# Patient Record
Sex: FEMALE | Race: OTHER | HISPANIC OR LATINO | URBAN - METROPOLITAN AREA
[De-identification: names, ages, dates, MRNs, and addresses within clinical notes are randomized per-mention and may not be internally consistent; named-entity substitution may affect disease eponyms.]

---

## 2023-04-14 ENCOUNTER — INPATIENT (INPATIENT)
Facility: HOSPITAL | Age: 68
LOS: 3 days | Discharge: HOME | DRG: 291 | End: 2023-04-18
Attending: HOSPITALIST | Admitting: HOSPITALIST
Payer: COMMERCIAL

## 2023-04-14 VITALS
OXYGEN SATURATION: 65 % | WEIGHT: 195.11 LBS | HEART RATE: 128 BPM | TEMPERATURE: 98 F | HEIGHT: 62 IN | RESPIRATION RATE: 40 BRPM | SYSTOLIC BLOOD PRESSURE: 85 MMHG | DIASTOLIC BLOOD PRESSURE: 70 MMHG

## 2023-04-14 DIAGNOSIS — J96.01 ACUTE RESPIRATORY FAILURE WITH HYPOXIA: ICD-10-CM

## 2023-04-14 DIAGNOSIS — I95.9 HYPOTENSION, UNSPECIFIED: ICD-10-CM

## 2023-04-14 DIAGNOSIS — Z90.49 ACQUIRED ABSENCE OF OTHER SPECIFIED PARTS OF DIGESTIVE TRACT: Chronic | ICD-10-CM

## 2023-04-14 DIAGNOSIS — M10.9 GOUT, UNSPECIFIED: ICD-10-CM

## 2023-04-14 DIAGNOSIS — I10 ESSENTIAL (PRIMARY) HYPERTENSION: ICD-10-CM

## 2023-04-14 DIAGNOSIS — Z29.9 ENCOUNTER FOR PROPHYLACTIC MEASURES, UNSPECIFIED: ICD-10-CM

## 2023-04-14 DIAGNOSIS — I50.1 LEFT VENTRICULAR FAILURE, UNSPECIFIED: ICD-10-CM

## 2023-04-14 DIAGNOSIS — R65.10 SYSTEMIC INFLAMMATORY RESPONSE SYNDROME (SIRS) OF NON-INFECTIOUS ORIGIN WITHOUT ACUTE ORGAN DYSFUNCTION: ICD-10-CM

## 2023-04-14 DIAGNOSIS — E11.9 TYPE 2 DIABETES MELLITUS WITHOUT COMPLICATIONS: ICD-10-CM

## 2023-04-14 DIAGNOSIS — I50.31 ACUTE DIASTOLIC (CONGESTIVE) HEART FAILURE: ICD-10-CM

## 2023-04-14 LAB
ALBUMIN SERPL ELPH-MCNC: 3.2 G/DL — LOW (ref 3.5–5)
ALP SERPL-CCNC: 111 U/L — SIGNIFICANT CHANGE UP (ref 40–120)
ALT FLD-CCNC: 27 U/L DA — SIGNIFICANT CHANGE UP (ref 10–60)
ANION GAP SERPL CALC-SCNC: 7 MMOL/L — SIGNIFICANT CHANGE UP (ref 5–17)
APPEARANCE UR: CLEAR — SIGNIFICANT CHANGE UP
APTT BLD: 28.5 SEC — SIGNIFICANT CHANGE UP (ref 27.5–35.5)
AST SERPL-CCNC: 24 U/L — SIGNIFICANT CHANGE UP (ref 10–40)
BACTERIA # UR AUTO: ABNORMAL /HPF
BASE EXCESS BLDV CALC-SCNC: -3.2 MMOL/L — SIGNIFICANT CHANGE UP
BASOPHILS # BLD AUTO: 0.1 K/UL — SIGNIFICANT CHANGE UP (ref 0–0.2)
BASOPHILS NFR BLD AUTO: 0.9 % — SIGNIFICANT CHANGE UP (ref 0–2)
BILIRUB SERPL-MCNC: 0.4 MG/DL — SIGNIFICANT CHANGE UP (ref 0.2–1.2)
BILIRUB UR-MCNC: NEGATIVE — SIGNIFICANT CHANGE UP
BLD GP AB SCN SERPL QL: SIGNIFICANT CHANGE UP
BUN SERPL-MCNC: 16 MG/DL — SIGNIFICANT CHANGE UP (ref 7–18)
CALCIUM SERPL-MCNC: 9.2 MG/DL — SIGNIFICANT CHANGE UP (ref 8.4–10.5)
CHLORIDE SERPL-SCNC: 104 MMOL/L — SIGNIFICANT CHANGE UP (ref 96–108)
CO2 SERPL-SCNC: 25 MMOL/L — SIGNIFICANT CHANGE UP (ref 22–31)
COLOR SPEC: YELLOW — SIGNIFICANT CHANGE UP
CREAT SERPL-MCNC: 0.97 MG/DL — SIGNIFICANT CHANGE UP (ref 0.5–1.3)
DIFF PNL FLD: NEGATIVE — SIGNIFICANT CHANGE UP
EGFR: 64 ML/MIN/1.73M2 — SIGNIFICANT CHANGE UP
EOSINOPHIL # BLD AUTO: 0.53 K/UL — HIGH (ref 0–0.5)
EOSINOPHIL NFR BLD AUTO: 4.7 % — SIGNIFICANT CHANGE UP (ref 0–6)
EPI CELLS # UR: SIGNIFICANT CHANGE UP /HPF
GLUCOSE BLDC GLUCOMTR-MCNC: 156 MG/DL — HIGH (ref 70–99)
GLUCOSE BLDC GLUCOMTR-MCNC: 177 MG/DL — HIGH (ref 70–99)
GLUCOSE BLDC GLUCOMTR-MCNC: 182 MG/DL — HIGH (ref 70–99)
GLUCOSE SERPL-MCNC: 300 MG/DL — HIGH (ref 70–99)
GLUCOSE UR QL: 100 MG/DL
HCO3 BLDV-SCNC: 26 MMOL/L — SIGNIFICANT CHANGE UP (ref 22–29)
HCT VFR BLD CALC: 34.6 % — SIGNIFICANT CHANGE UP (ref 34.5–45)
HGB BLD-MCNC: 10.6 G/DL — LOW (ref 11.5–15.5)
HYALINE CASTS # UR AUTO: ABNORMAL /LPF
IMM GRANULOCYTES NFR BLD AUTO: 0.6 % — SIGNIFICANT CHANGE UP (ref 0–0.9)
INR BLD: 1.04 RATIO — SIGNIFICANT CHANGE UP (ref 0.88–1.16)
KETONES UR-MCNC: NEGATIVE — SIGNIFICANT CHANGE UP
LACTATE SERPL-SCNC: 2.8 MMOL/L — HIGH (ref 0.7–2)
LACTATE SERPL-SCNC: 3.5 MMOL/L — HIGH (ref 0.7–2)
LACTATE SERPL-SCNC: 4 MMOL/L — CRITICAL HIGH (ref 0.7–2)
LEUKOCYTE ESTERASE UR-ACNC: ABNORMAL
LYMPHOCYTES # BLD AUTO: 3.91 K/UL — HIGH (ref 1–3.3)
LYMPHOCYTES # BLD AUTO: 34.5 % — SIGNIFICANT CHANGE UP (ref 13–44)
MCHC RBC-ENTMCNC: 23.6 PG — LOW (ref 27–34)
MCHC RBC-ENTMCNC: 30.6 GM/DL — LOW (ref 32–36)
MCV RBC AUTO: 76.9 FL — LOW (ref 80–100)
MONOCYTES # BLD AUTO: 0.5 K/UL — SIGNIFICANT CHANGE UP (ref 0–0.9)
MONOCYTES NFR BLD AUTO: 4.4 % — SIGNIFICANT CHANGE UP (ref 2–14)
NEUTROPHILS # BLD AUTO: 6.21 K/UL — SIGNIFICANT CHANGE UP (ref 1.8–7.4)
NEUTROPHILS NFR BLD AUTO: 54.9 % — SIGNIFICANT CHANGE UP (ref 43–77)
NITRITE UR-MCNC: POSITIVE
NRBC # BLD: 0 /100 WBCS — SIGNIFICANT CHANGE UP (ref 0–0)
NT-PROBNP SERPL-SCNC: 227 PG/ML — HIGH (ref 0–125)
PCO2 BLDV: 61 MMHG — HIGH (ref 39–42)
PH BLDV: 7.23 — LOW (ref 7.32–7.43)
PH UR: 5 — SIGNIFICANT CHANGE UP (ref 5–8)
PLATELET # BLD AUTO: 336 K/UL — SIGNIFICANT CHANGE UP (ref 150–400)
PO2 BLDV: 62 MMHG — SIGNIFICANT CHANGE UP
POTASSIUM SERPL-MCNC: 3.9 MMOL/L — SIGNIFICANT CHANGE UP (ref 3.5–5.3)
POTASSIUM SERPL-SCNC: 3.9 MMOL/L — SIGNIFICANT CHANGE UP (ref 3.5–5.3)
PROT SERPL-MCNC: 8.5 G/DL — HIGH (ref 6–8.3)
PROT UR-MCNC: 30 MG/DL
PROTHROM AB SERPL-ACNC: 12.4 SEC — SIGNIFICANT CHANGE UP (ref 10.5–13.4)
RBC # BLD: 4.5 M/UL — SIGNIFICANT CHANGE UP (ref 3.8–5.2)
RBC # FLD: 16.9 % — HIGH (ref 10.3–14.5)
RBC CASTS # UR COMP ASSIST: SIGNIFICANT CHANGE UP /HPF (ref 0–2)
SAO2 % BLDV: 84.1 % — SIGNIFICANT CHANGE UP
SARS-COV-2 RNA SPEC QL NAA+PROBE: SIGNIFICANT CHANGE UP
SODIUM SERPL-SCNC: 136 MMOL/L — SIGNIFICANT CHANGE UP (ref 135–145)
SP GR SPEC: 1.02 — SIGNIFICANT CHANGE UP (ref 1.01–1.02)
TROPONIN I, HIGH SENSITIVITY RESULT: 14.1 NG/L — SIGNIFICANT CHANGE UP
UROBILINOGEN FLD QL: NEGATIVE — SIGNIFICANT CHANGE UP
WBC # BLD: 11.32 K/UL — HIGH (ref 3.8–10.5)
WBC # FLD AUTO: 11.32 K/UL — HIGH (ref 3.8–10.5)
WBC UR QL: SIGNIFICANT CHANGE UP /HPF (ref 0–5)

## 2023-04-14 PROCEDURE — 99291 CRITICAL CARE FIRST HOUR: CPT

## 2023-04-14 PROCEDURE — 71045 X-RAY EXAM CHEST 1 VIEW: CPT | Mod: 26

## 2023-04-14 PROCEDURE — 71275 CT ANGIOGRAPHY CHEST: CPT | Mod: 26,MA

## 2023-04-14 PROCEDURE — 99223 1ST HOSP IP/OBS HIGH 75: CPT | Mod: GC

## 2023-04-14 RX ORDER — ACETAMINOPHEN 500 MG
650 TABLET ORAL EVERY 6 HOURS
Refills: 0 | Status: DISCONTINUED | OUTPATIENT
Start: 2023-04-14 | End: 2023-04-18

## 2023-04-14 RX ORDER — ONDANSETRON 8 MG/1
4 TABLET, FILM COATED ORAL EVERY 8 HOURS
Refills: 0 | Status: DISCONTINUED | OUTPATIENT
Start: 2023-04-14 | End: 2023-04-18

## 2023-04-14 RX ORDER — CARVEDILOL PHOSPHATE 80 MG/1
3.12 CAPSULE, EXTENDED RELEASE ORAL EVERY 12 HOURS
Refills: 0 | Status: DISCONTINUED | OUTPATIENT
Start: 2023-04-14 | End: 2023-04-15

## 2023-04-14 RX ORDER — LISINOPRIL 2.5 MG/1
2.5 TABLET ORAL DAILY
Refills: 0 | Status: DISCONTINUED | OUTPATIENT
Start: 2023-04-14 | End: 2023-04-18

## 2023-04-14 RX ORDER — ASPIRIN/CALCIUM CARB/MAGNESIUM 324 MG
81 TABLET ORAL DAILY
Refills: 0 | Status: DISCONTINUED | OUTPATIENT
Start: 2023-04-14 | End: 2023-04-18

## 2023-04-14 RX ORDER — ENOXAPARIN SODIUM 100 MG/ML
40 INJECTION SUBCUTANEOUS EVERY 24 HOURS
Refills: 0 | Status: DISCONTINUED | OUTPATIENT
Start: 2023-04-14 | End: 2023-04-18

## 2023-04-14 RX ORDER — INSULIN LISPRO 100/ML
6 VIAL (ML) SUBCUTANEOUS
Refills: 0 | Status: DISCONTINUED | OUTPATIENT
Start: 2023-04-14 | End: 2023-04-18

## 2023-04-14 RX ORDER — ALLOPURINOL 300 MG
100 TABLET ORAL DAILY
Refills: 0 | Status: DISCONTINUED | OUTPATIENT
Start: 2023-04-14 | End: 2023-04-18

## 2023-04-14 RX ORDER — LANOLIN ALCOHOL/MO/W.PET/CERES
3 CREAM (GRAM) TOPICAL AT BEDTIME
Refills: 0 | Status: DISCONTINUED | OUTPATIENT
Start: 2023-04-14 | End: 2023-04-18

## 2023-04-14 RX ORDER — INSULIN GLARGINE 100 [IU]/ML
10 INJECTION, SOLUTION SUBCUTANEOUS AT BEDTIME
Refills: 0 | Status: DISCONTINUED | OUTPATIENT
Start: 2023-04-14 | End: 2023-04-18

## 2023-04-14 RX ORDER — FUROSEMIDE 40 MG
20 TABLET ORAL
Refills: 0 | Status: DISCONTINUED | OUTPATIENT
Start: 2023-04-14 | End: 2023-04-16

## 2023-04-14 RX ORDER — INSULIN LISPRO 100/ML
VIAL (ML) SUBCUTANEOUS
Refills: 0 | Status: DISCONTINUED | OUTPATIENT
Start: 2023-04-14 | End: 2023-04-18

## 2023-04-14 RX ADMIN — Medication 1: at 16:06

## 2023-04-14 RX ADMIN — Medication 81 MILLIGRAM(S): at 16:06

## 2023-04-14 RX ADMIN — INSULIN GLARGINE 10 UNIT(S): 100 INJECTION, SOLUTION SUBCUTANEOUS at 22:09

## 2023-04-14 RX ADMIN — ENOXAPARIN SODIUM 40 MILLIGRAM(S): 100 INJECTION SUBCUTANEOUS at 16:06

## 2023-04-14 RX ADMIN — CARVEDILOL PHOSPHATE 3.12 MILLIGRAM(S): 80 CAPSULE, EXTENDED RELEASE ORAL at 20:24

## 2023-04-14 RX ADMIN — Medication 20 MILLIGRAM(S): at 16:05

## 2023-04-14 RX ADMIN — Medication 1: at 21:54

## 2023-04-14 NOTE — CONSULT NOTE ADULT - ASSESSMENT
67 female, from home, with PMHx of DM, HTN, Gout, former smoker, who comes in from a rapid response in the lobby for shortness of breath,acute disatolic HF.  1.Tele monitoring.  2.Echocardiogram.  3.Acute distolic HF-IV lasix 20mg bid.  4.HTN-low dose ace and coreg.  5.DM-Insulin.  6.Cont asa,statin.  7.GI and DVT prophylaxis.

## 2023-04-14 NOTE — ED ADULT TRIAGE NOTE - CHIEF COMPLAINT QUOTE
Rapid response from lobby with C/o SOB since this morning. Unable to tolerate PO temp. Reports scheduled for NHUNG today with cardiology

## 2023-04-14 NOTE — ED PROVIDER NOTE - CLINICAL SUMMARY MEDICAL DECISION MAKING FREE TEXT BOX
very concerning vitals on arrival, profound hypoxic on RA (65%) and severe hypotension (60s systolic),  labs for sepsis, AHF, ARF requested and placed on NRB; BP and Spo2 corrected within minutes  rectal temp afebrile, considering more cardiac renal causes  reviewed old records, last record here 2016 non contributory at this time

## 2023-04-14 NOTE — H&P ADULT - HISTORY OF PRESENT ILLNESS
Patient is a 67 female, from home, with PMHx of DM, HTN, Gout, former smoker, who comes in from a rapid response in the lobby for shortness of breath. She has been feeling normal yesterday. Since this morning, she felt short of breath. It progressively got worse over time and when she was in the lobby, she told the staff that she does not feel good. She denied any chest pain. Patient denies headache, fever, chills, nausea, vomit, chest pain, cough, abdominal pain, diarrhea, constipation, dark/bloody stool, dysuria, hematuria, loss of strength, or loss of sensation.  The patient was scheduled to get a NHUNG today.

## 2023-04-14 NOTE — H&P ADULT - ATTENDING COMMENTS
67F with PMHx of HTN, T2DM and known aortic/mitral stenosis was initially here for NHUNG, but had RRT called in the lobby for hypotension, hypoxia, and unwellness. Patient recently admitted to [facility] for Pneumonia & pulmonary edema (Feb. 2023). She was following up with her outpatient cardiologist who had performed a TTE on Dec. 2022. Patient pending NHUNG for better visualization. She does have ESPINAL. Chronically poor exercise tolerance. While in the ED her VS stabilized without intervention.    VSS now, saturating well on 2 L NC  +crackles  +systolic murmur 4/6 in aortic region    Labs personally reviewed  WBC: 11  Lactate 4 (done during rapid)    CTA Chest personally reviewed by me: no PE, +pulmonary edema    A/P:  Likely AHRF 2/2 to decompensated heart disease from likely known severe valvular pathology. Cardiology consulted & recommending TTE for now, will consider NHUNG. Low dose BP medications. Patient with possible cirrhotic changes on CT, but she knows about this. Pending further cardiac work up and management of volume status.

## 2023-04-14 NOTE — ED PROVIDER NOTE - OBJECTIVE STATEMENT
seen at start of my shift, 0730, came via walk in for shortness of breath, had an appt at the hospital today for a NHUNG  recently admitted to [facility]  co shortness of breath onset this am, no cp n v d, no sick contacts, non smoker, no drugs

## 2023-04-14 NOTE — H&P ADULT - PROBLEM SELECTOR PLAN 1
- P/w shortness of breath  - 65% on room air -> NRB -> 2L NC saturating 100%. Patient is comfortable, talking  - Likely due to flash cardiogenic pulmonary edema from valvular dysfunction  - CTA chest = No PE. Likely pulmonary edema with small bilateral pleural effusions.  - Trop negative  - Pro-BNP = 227  - EKG = Sinus tachycardia. Septal infarct, age undetermined  - Admit to telemetry  - Cardio Dr. Hall consulted, will wait on recs - P/w shortness of breath  - 65% on room air -> NRB -> 2L NC saturating 100%. Patient is comfortable, talking  - Likely due to flash cardiogenic pulmonary edema from valvular dysfunction  - CTA chest = No PE. Likely pulmonary edema with small bilateral pleural effusions.  - Trop negative  - Pro-BNP = 227  - EKG = Sinus tachycardia. Septal infarct, age undetermined  - Admit to telemetry  - Cardio Dr. Hall consulted = Echo

## 2023-04-14 NOTE — H&P ADULT - PROBLEM SELECTOR PLAN 5
- Home med Humalog 18U BID, Metformin 500 BID, Ozempic 0.25 weekly  - Adding Admelog 5U TID premeals + 10U Lantus  - ISS + FS achs  - Titrate up as needed - Home med Humalog 18U BID, Metformin 500 BID, Ozempic 0.25 weekly  - Adding Admelog 6U TID premeals + 10U Lantus  - ISS + FS achs  - Titrate up as needed

## 2023-04-14 NOTE — H&P ADULT - PROBLEM SELECTOR PLAN 4
- Home meds Amlodipine 5mg, Metoprolol Succinate 25mg ER, Enalapril 20mg daily  - Will hold home meds - Home meds Amlodipine 5mg, Metoprolol Succinate 25mg ER, Enalapril 20mg daily  - Will hold home meds for now due to recent hypotension  - Consider adding Metoprolol first - Home meds Amlodipine 5mg, Metoprolol Succinate 25mg ER, Enalapril 20mg daily  - Cardio Dr. Hall = Coreg 3.125 BID, Lisinopril 2.5 BID

## 2023-04-14 NOTE — ED ADULT NURSE NOTE - OBJECTIVE STATEMENT
Pt arrived to ED via rapid response in the lobby, was scheduled for NHUNG today but c/o difficulty breathing and cough  Pt is awake alert and oriented x 3 , afebrile , diaphoretic and has difficulty breathing

## 2023-04-14 NOTE — ED PROVIDER NOTE - CARE PLAN
Principal Discharge DX:	Acute diastolic congestive heart failure  Secondary Diagnosis:	Hypoxemia  Secondary Diagnosis:	Hypotension   1

## 2023-04-14 NOTE — ED PROVIDER NOTE - SEVERE SEPSIS ALERT DETAILS
seen at start of my shift, 0730, came via walk in for shortness of breath, had an appt at the hospital today for a NHUNG  recently admitted to [facility]  co shortness of breath onset this am, no cp n v d, no sick contacts, non smoker, no drugs

## 2023-04-14 NOTE — CONSULT NOTE ADULT - SUBJECTIVE AND OBJECTIVE BOX
CHIEF COMPLAINT:Patient is a 67y old  Female who presents with a chief complaint of AHRF secondary to acute cardiogenic pulmonary edema, with valvular involvement. (14 Apr 2023 12:19)      HPI:  Patient is a 67 female, from home, with PMHx of DM, HTN, Gout, former smoker, who comes in from a rapid response in the lobby for shortness of breath. She has been feeling normal yesterday. Since this morning, she felt short of breath. It progressively got worse over time and when she was in the lobby, she told the staff that she does not feel good. She denied any chest pain. Patient denies headache, fever, chills, nausea, vomit, chest pain, cough, abdominal pain, diarrhea, constipation, dark/bloody stool, dysuria, hematuria, loss of strength, or loss of sensation.  The patient was scheduled to get a NHUNG today to eval AS and mS severity. Pt was at [facility] 2/23 for pulmonary edema.      PAST MEDICAL & SURGICAL HISTORY:  DM (diabetes mellitus)      HTN (hypertension)      S/P cholecystectomy          MEDICATIONS  (STANDING):  enoxaparin Injectable 40 milliGRAM(s) SubCutaneous every 24 hours  insulin glargine Injectable (LANTUS) 10 Unit(s) SubCutaneous at bedtime  insulin lispro (ADMELOG) corrective regimen sliding scale   SubCutaneous Before meals and at bedtime  insulin lispro Injectable (ADMELOG) 6 Unit(s) SubCutaneous three times a day before meals    MEDICATIONS  (PRN):  acetaminophen     Tablet .. 650 milliGRAM(s) Oral every 6 hours PRN Temp greater or equal to 38C (100.4F), Mild Pain (1 - 3)  aluminum hydroxide/magnesium hydroxide/simethicone Suspension 30 milliLiter(s) Oral every 4 hours PRN Dyspepsia  melatonin 3 milliGRAM(s) Oral at bedtime PRN Insomnia  ondansetron Injectable 4 milliGRAM(s) IV Push every 8 hours PRN Nausea and/or Vomiting      FAMILY HISTORY:  FH: COPD (chronic obstructive pulmonary disease) (Father)        SOCIAL HISTORY:    [x ] Non-smoker    [x ] Alcohol-denies    Allergies    DE (Anaphylaxis)  No Known Drug Allergies    Intolerances    	    REVIEW OF SYSTEMS:  CONSTITUTIONAL: No fever, weight loss, or fatigue  EYES: No eye pain, visual disturbances, or discharge  ENT:  No difficulty hearing, tinnitus, vertigo; No sinus or throat pain  NECK: No pain or stiffness  RESPIRATORY: No cough, wheezing, chills or hemoptysis; + Shortness of Breath  CARDIOVASCULAR: No chest pain, palpitations, passing out, dizziness, or leg swelling  GASTROINTESTINAL: No abdominal or epigastric pain. No nausea, vomiting, or hematemesis; No diarrhea or constipation. No melena or hematochezia.  GENITOURINARY: No dysuria, frequency, hematuria, or incontinence  NEUROLOGICAL: No headaches, memory loss, loss of strength, numbness, or tremors  SKIN: No itching, burning, rashes, or lesions   LYMPH Nodes: No enlarged glands  ENDOCRINE: No heat or cold intolerance; No hair loss  MUSCULOSKELETAL: No joint pain or swelling; No muscle, back, or extremity pain  PSYCHIATRIC: No depression, anxiety, mood swings, or difficulty sleeping  HEME/LYMPH: No easy bruising, or bleeding gums  ALLERGY AND IMMUNOLOGIC: No hives or eczema	        PHYSICAL EXAM:  T(C): 36.9 (04-14-23 @ 10:54), Max: 36.9 (04-14-23 @ 10:54)  HR: 78 (04-14-23 @ 10:54) (78 - 128)  BP: 121/64 (04-14-23 @ 10:54) (85/70 - 150/67)  RR: 16 (04-14-23 @ 10:54) (16 - 40)  SpO2: 98% (04-14-23 @ 10:54) (65% - 100%)  Wt(kg): --  I&O's Summary      Appearance: Normal	  HEENT:   Normal oral mucosa, PERRL, EOMI	  Lymphatic: No lymphadenopathy  Cardiovascular: Normal S1 S2, No JVD, No murmurs, No edema  Respiratory: Lungs clear to auscultation	  Psychiatry: A & O x 3, Mood & affect appropriate  Gastrointestinal:  Soft, Non-tender, + BS	  Skin: No rashes, No ecchymoses, No cyanosis	  Neurologic: Non-focal  Extremities: Normal range of motion, No clubbing, cyanosis or edema  Vascular: Peripheral pulses palpable 2+ bilaterally       ECG:  	sinus tach,q v1 and v2  	  	  LABS:	 	    Troponin I, High Sensitivity Result: 14.1 ng/L (04-14-23 @ 07:40)                          10.6   11.32 )-----------( 336      ( 14 Apr 2023 07:40 )             34.6     04-14    136  |  104  |  16  ----------------------------<  300<H>  3.9   |  25  |  0.97    Ca    9.2      14 Apr 2023 07:40    TPro  8.5<H>  /  Alb  3.2<L>  /  TBili  0.4  /  DBili  x   /  AST  24  /  ALT  27  /  AlkPhos  111  04-14    < from: CT Angio Chest PE Protocol w/ IV Cont (04.14.23 @ 10:42) >  ACC: [id number] EXAM:  CT ANGIO CHEST PULM ART [facility]   ORDERED BY: CLAUDIA CULP     PROCEDURE DATE:  04/14/2023          INTERPRETATION:  HISTORY: Shortness of breath.    EXAMINATION: CTA CHEST was performed for evaluation of the pulmonary   arteries. Multiplanar reformatted images and MIPS were acquired.  CONTRAST/COMPLICATIONS:  IV Contrast: Omnipaque 350  70 cc administered   30 cc discarded  Oral Contrast: NONE  Complications: None reported at time of study completion    COMPARISON: 11/1/2016.    FINDINGS:    PULMONARY ARTERIES: No pulmonary embolism.    MEDIASTINUM: Mitral annular calcifications. Dilated left atrium. Coronary   atherosclerosis. No pericardial effusion. Ascending thoracic aorta   measures 3.9 cm. Mediastinal bilateral hilar lymphadenopathy appears   similar compared to 1116; reference subcarinal 2.6 x 1.7 cm node (image   227, series 4).    AIRWAYS, LUNGS, PLEURA: Clear central airways. Emphysema. Hazy   ground-glass opacities throughout the lungs bilaterally.Interlobular   septal thickening. Small bilateral pleural effusions (right greater than   left) with associated right basilar atelectasis.    IMAGED ABDOMEN: Surface contour nodularity of the liver. Cholecystectomy.   Left adrenal myelolipoma. Few prominent upper abdominal nodes as on   11/1/2016.    SOFT TISSUES: Anterior chest wall collateral vessels.    BONES: Unremarkable.      IMPRESSION:.    No pulmonary embolism.    Likely pulmonary edema with small bilateral pleural effusions.    Surface contour nodularity of the liver is suggested; possibility of   chronic liver disease is raised and recommend correlation with LFTs.    --- End of Report ---             MACHO PETER MD; Attending Radiologist  This document has been electronically signed. Apr 14 2023 11:00AM    < end of copied text >       CHIEF COMPLAINT:Patient is a 67y old  Female who presents with a chief complaint of AHRF secondary to acute cardiogenic pulmonary edema, with valvular involvement. (14 Apr 2023 12:19)      HPI:  Patient is a 67 female, from home, with PMHx of DM, HTN, Gout, former smoker, who comes in from a rapid response in the lobby for shortness of breath. She has been feeling normal yesterday. Since this morning, she felt short of breath. It progressively got worse over time and when she was in the lobby, she told the staff that she does not feel good. She denied any chest pain. Patient denies headache, fever, chills, nausea, vomit, chest pain, cough, abdominal pain, diarrhea, constipation, dark/bloody stool, dysuria, hematuria, loss of strength, or loss of sensation.  The patient was scheduled to get a NHUNG today to eval AS and mS severity. Pt was at [facility] 2/23 for pulmonary edema.      PAST MEDICAL & SURGICAL HISTORY:  DM (diabetes mellitus)      HTN (hypertension)      S/P cholecystectomy          MEDICATIONS  (STANDING):  enoxaparin Injectable 40 milliGRAM(s) SubCutaneous every 24 hours  insulin glargine Injectable (LANTUS) 10 Unit(s) SubCutaneous at bedtime  insulin lispro (ADMELOG) corrective regimen sliding scale   SubCutaneous Before meals and at bedtime  insulin lispro Injectable (ADMELOG) 6 Unit(s) SubCutaneous three times a day before meals    MEDICATIONS  (PRN):  acetaminophen     Tablet .. 650 milliGRAM(s) Oral every 6 hours PRN Temp greater or equal to 38C (100.4F), Mild Pain (1 - 3)  aluminum hydroxide/magnesium hydroxide/simethicone Suspension 30 milliLiter(s) Oral every 4 hours PRN Dyspepsia  melatonin 3 milliGRAM(s) Oral at bedtime PRN Insomnia  ondansetron Injectable 4 milliGRAM(s) IV Push every 8 hours PRN Nausea and/or Vomiting      FAMILY HISTORY:  FH: COPD (chronic obstructive pulmonary disease) (Father)        SOCIAL HISTORY:    [x ] Non-smoker    [x ] Alcohol-denies    Allergies    DE (Anaphylaxis)  No Known Drug Allergies    Intolerances    	    REVIEW OF SYSTEMS:  CONSTITUTIONAL: No fever, weight loss, or fatigue  EYES: No eye pain, visual disturbances, or discharge  ENT:  No difficulty hearing, tinnitus, vertigo; No sinus or throat pain  NECK: No pain or stiffness  RESPIRATORY: No cough, wheezing, chills or hemoptysis; + Shortness of Breath  CARDIOVASCULAR: No chest pain, palpitations, passing out, dizziness, or leg swelling  GASTROINTESTINAL: No abdominal or epigastric pain. No nausea, vomiting, or hematemesis; No diarrhea or constipation. No melena or hematochezia.  GENITOURINARY: No dysuria, frequency, hematuria, or incontinence  NEUROLOGICAL: No headaches, memory loss, loss of strength, numbness, or tremors  SKIN: No itching, burning, rashes, or lesions   LYMPH Nodes: No enlarged glands  ENDOCRINE: No heat or cold intolerance; No hair loss  MUSCULOSKELETAL: No joint pain or swelling; No muscle, back, or extremity pain  PSYCHIATRIC: No depression, anxiety, mood swings, or difficulty sleeping  HEME/LYMPH: No easy bruising, or bleeding gums  ALLERGY AND IMMUNOLOGIC: No hives or eczema	        PHYSICAL EXAM:  T(C): 36.9 (04-14-23 @ 10:54), Max: 36.9 (04-14-23 @ 10:54)  HR: 78 (04-14-23 @ 10:54) (78 - 128)  BP: 121/64 (04-14-23 @ 10:54) (85/70 - 150/67)  RR: 16 (04-14-23 @ 10:54) (16 - 40)  SpO2: 98% (04-14-23 @ 10:54) (65% - 100%)  Wt(kg): --  I&O's Summary      Appearance: Normal	  HEENT:   Normal oral mucosa, PERRL, EOMI	  Lymphatic: No lymphadenopathy  Cardiovascular: Normal S1 S2, 2/6sm  Respiratory: Lungs clear to auscultation	  Psychiatry: A & O x 3, Mood & affect appropriate  Gastrointestinal:  Soft, Non-tender, + BS	  Skin: No rashes, No ecchymoses, No cyanosis	  Neurologic: Non-focal  Extremities: Normal range of motion, No clubbing, cyanosis or edema  Vascular: Peripheral pulses palpable 2+ bilaterally       ECG:  	sinus tach,q v1 and v2  	  	  LABS:	 	    Troponin I, High Sensitivity Result: 14.1 ng/L (04-14-23 @ 07:40)                          10.6   11.32 )-----------( 336      ( 14 Apr 2023 07:40 )             34.6     04-14    136  |  104  |  16  ----------------------------<  300<H>  3.9   |  25  |  0.97    Ca    9.2      14 Apr 2023 07:40    TPro  8.5<H>  /  Alb  3.2<L>  /  TBili  0.4  /  DBili  x   /  AST  24  /  ALT  27  /  AlkPhos  111  04-14    < from: CT Angio Chest PE Protocol w/ IV Cont (04.14.23 @ 10:42) >  ACC: [id number] EXAM:  CT ANGIO CHEST PULM ART [facility]   ORDERED BY: CLAUDIA CULP     PROCEDURE DATE:  04/14/2023          INTERPRETATION:  HISTORY: Shortness of breath.    EXAMINATION: CTA CHEST was performed for evaluation of the pulmonary   arteries. Multiplanar reformatted images and MIPS were acquired.  CONTRAST/COMPLICATIONS:  IV Contrast: Omnipaque 350  70 cc administered   30 cc discarded  Oral Contrast: NONE  Complications: None reported at time of study completion    COMPARISON: 11/1/2016.    FINDINGS:    PULMONARY ARTERIES: No pulmonary embolism.    MEDIASTINUM: Mitral annular calcifications. Dilated left atrium. Coronary   atherosclerosis. No pericardial effusion. Ascending thoracic aorta   measures 3.9 cm. Mediastinal bilateral hilar lymphadenopathy appears   similar compared to 1116; reference subcarinal 2.6 x 1.7 cm node (image   227, series 4).    AIRWAYS, LUNGS, PLEURA: Clear central airways. Emphysema. Hazy   ground-glass opacities throughout the lungs bilaterally.Interlobular   septal thickening. Small bilateral pleural effusions (right greater than   left) with associated right basilar atelectasis.    IMAGED ABDOMEN: Surface contour nodularity of the liver. Cholecystectomy.   Left adrenal myelolipoma. Few prominent upper abdominal nodes as on   11/1/2016.    SOFT TISSUES: Anterior chest wall collateral vessels.    BONES: Unremarkable.      IMPRESSION:.    No pulmonary embolism.    Likely pulmonary edema with small bilateral pleural effusions.    Surface contour nodularity of the liver is suggested; possibility of   chronic liver disease is raised and recommend correlation with LFTs.    --- End of Report ---             MACHO PETER MD; Attending Radiologist  This document has been electronically signed. Apr 14 2023 11:00AM    < end of copied text >

## 2023-04-14 NOTE — H&P ADULT - ASSESSMENT
Patient is a 67 female, from home, with PMHx of DM, HTN, Gout, former smoker, who comes in from a rapid response in the lobby for shortness of breath. Found to have pulmonary edema. Admitted for AHRF secondary to acute cardiogenic pulmonary edema, with valvular involvement.

## 2023-04-14 NOTE — ED ADULT NURSE NOTE - HOW OFTEN DO YOU HAVE A DRINK CONTAINING ALCOHOL?
Never attending and resident interpretation of xr no acute fractures, likely mild arthritis, discussed these results with patient and son and instructed to f/u with orthopedics, referral numbers given, all questions answered, patient ambulatory, ready for dc.

## 2023-04-14 NOTE — ED PROVIDER NOTE - PROGRESS NOTE DETAILS
reviewed cxr at bedide looks like CHF, BP immediately improved to normal, transitioned fron nrb to venti mask at 45% and tolerated well, then transitioned to nc and satting normally  on multipe reeval she appears more comfortable and relazed and her BP normalized and has stayed  lactate is elevated but drawn when hypotense, BNp slightly raised but lower than anticipated for acute chf, ct pe was requested and no PE but has chf appearance, will admit due to severe hypotense hypoxic presentaiton, pmd heather admit to [facility]omo and judy, discussed w both

## 2023-04-14 NOTE — H&P ADULT - PROBLEM SELECTOR PLAN 2
- As above  - Hx of mitral stenosis? aortic stenosis?  - Was pending NHUNG today - As above  - From MS, AS. vs New CHF  - Was pending NHUNG outpt  - Cardio Dr. Hall consulted = Echo - As above  - From MS, AS. vs New CHF  - Was pending NHUNG outpt  - Cardio Dr. Hall consulted = Echo  - Gentle diuresis

## 2023-04-14 NOTE — H&P ADULT - NSICDXFAMILYHX_GEN_ALL_CORE_FT
FAMILY HISTORY:  Father  Still living? Unknown  FH: COPD (chronic obstructive pulmonary disease), Age at diagnosis: Age Unknown

## 2023-04-14 NOTE — ED ADULT NURSE NOTE - NSIMPLEMENTINTERV_GEN_ALL_ED
Implemented All Fall Risk Interventions:  [place] to call system. Call bell, personal items and telephone within reach. Instruct patient to call for assistance. Room bathroom lighting operational. Non-slip footwear when patient is off stretcher. Physically safe environment: no spills, clutter or unnecessary equipment. Stretcher in lowest position, wheels locked, appropriate side rails in place. Provide visual cue, wrist band, yellow gown, etc. Monitor gait and stability. Monitor for mental status changes and reorient to person, place, and time. Review medications for side effects contributing to fall risk. Reinforce activity limits and safety measures with patient and family.

## 2023-04-14 NOTE — H&P ADULT - PROBLEM SELECTOR PLAN 3
- P/w WBC 11.3 with hypotension 85/70  - Afebrile  - Likely reactive to flash pulmonary edema  - Lactate 4.0 (likely due to flash pulmonary edema cause hypoxia) -> F/u Lactate  - F/u BCx, UCx

## 2023-04-14 NOTE — H&P ADULT - NSHPPHYSICALEXAM_GEN_ALL_CORE
GENERAL: NAD, well-groomed, well-developed  HEAD:  Atraumatic, Normocephalic  EYES: EOMI, PERRLA, conjunctiva and sclera clear  ENMT: No tonsillar erythema, exudates, or enlargement; Moist mucous membranes, No lesions  NECK: Supple, normal appearance, No JVD; Normal thyroid; Trachea midline  NERVOUS SYSTEM: Alert & Oriented X3,  Motor Strength 5/5 B/L upper and lower extremities, sensation intact  CHEST/LUNG: Decreased lung sounds bilaterally with mild crackles. No rhonchi, wheezing   HEART: Regular rate and rhythm; 2/6 Systolic murmur  ABDOMEN: Soft, Nontender, Nondistended; Bowel sounds present  EXTREMITIES:  2+ Peripheral Pulses, No clubbing, cyanosis, or edema  LYMPH: No lymphadenopathy noted  SKIN: No rashes or lesions;  Good capillary refill Vital Signs Last 24 Hrs  T(C): 36.7 (14 Apr 2023 16:00), Max: 36.9 (14 Apr 2023 10:54)  T(F): 98.1 (14 Apr 2023 16:00), Max: 98.4 (14 Apr 2023 10:54)  HR: 80 (14 Apr 2023 16:00) (78 - 128)  BP: 125/62 (14 Apr 2023 16:00) (85/70 - 150/67)  BP(mean): --  RR: 18 (14 Apr 2023 16:00) (16 - 40)  SpO2: 95% (14 Apr 2023 16:00) (65% - 100%)    Parameters below as of 14 Apr 2023 16:00  Patient On (Oxygen Delivery Method): room air        GENERAL: NAD, well-groomed, well-developed  HEAD:  Atraumatic, Normocephalic  EYES: EOMI, PERRLA, conjunctiva and sclera clear  ENMT: No tonsillar erythema, exudates, or enlargement; Moist mucous membranes, No lesions  NECK: Supple, normal appearance, No JVD; Normal thyroid; Trachea midline  NERVOUS SYSTEM: Alert & Oriented X3,  Motor Strength 5/5 B/L upper and lower extremities, sensation intact  CHEST/LUNG: Decreased lung sounds bilaterally with mild crackles. No rhonchi, wheezing   HEART: Regular rate and rhythm; 2/6 Systolic murmur  ABDOMEN: Soft, Nontender, Nondistended; Bowel sounds present  EXTREMITIES:  2+ Peripheral Pulses, No clubbing, cyanosis, or edema  LYMPH: No lymphadenopathy noted  SKIN: No rashes or lesions;  Good capillary refill

## 2023-04-14 NOTE — H&P ADULT - NSHPREVIEWOFSYSTEMS_GEN_ALL_CORE
CONSTITUTIONAL: No fever, chills, weight loss, or generalized weakness  EYES: No eye pain, visual disturbances, or discharge  ENT:  No difficulty hearing, tinnitus, vertigo; No sinus or throat pain  NECK: No pain or stiffness  RESPIRATORY: No cough, wheezing, or hemoptysis; (+)Shortness of Breath  CARDIOVASCULAR: No chest pain, palpitations, passing out, (+)lightheadedness, or leg swelling  GASTROINTESTINAL: No abdominal or epigastric pain. No nausea, vomiting, or hematemesis; No diarrhea or constipation. No melena or hematochezia.  GENITOURINARY: No dysuria, frequency, hematuria, or incontinence  NEUROLOGICAL: No headaches, memory loss, loss of strength, numbness, or tremors  SKIN: No itching, burning, rashes, or lesions   LYMPH Nodes: No enlarged glands  ENDOCRINE: No heat or cold intolerance; No hair loss  MUSCULOSKELETAL: No joint pain or swelling; No muscle, back, No extremity pain  PSYCHIATRIC: No depression, anxiety, mood swings, or difficulty sleeping  HEME/LYMPH: No easy bruising, or bleeding gums  ALLERGY AND IMMUNOLOGIC: No hives or eczema

## 2023-04-15 LAB
ALBUMIN SERPL ELPH-MCNC: 2.9 G/DL — LOW (ref 3.5–5)
ALP SERPL-CCNC: 89 U/L — SIGNIFICANT CHANGE UP (ref 40–120)
ALT FLD-CCNC: 23 U/L DA — SIGNIFICANT CHANGE UP (ref 10–60)
ANION GAP SERPL CALC-SCNC: 9 MMOL/L — SIGNIFICANT CHANGE UP (ref 5–17)
APPEARANCE UR: CLEAR — SIGNIFICANT CHANGE UP
AST SERPL-CCNC: 19 U/L — SIGNIFICANT CHANGE UP (ref 10–40)
BILIRUB SERPL-MCNC: 0.4 MG/DL — SIGNIFICANT CHANGE UP (ref 0.2–1.2)
BILIRUB UR-MCNC: NEGATIVE — SIGNIFICANT CHANGE UP
BUN SERPL-MCNC: 19 MG/DL — HIGH (ref 7–18)
CALCIUM SERPL-MCNC: 9.5 MG/DL — SIGNIFICANT CHANGE UP (ref 8.4–10.5)
CHLORIDE SERPL-SCNC: 100 MMOL/L — SIGNIFICANT CHANGE UP (ref 96–108)
CHOLEST SERPL-MCNC: 140 MG/DL — SIGNIFICANT CHANGE UP
CO2 SERPL-SCNC: 27 MMOL/L — SIGNIFICANT CHANGE UP (ref 22–31)
COLOR SPEC: YELLOW — SIGNIFICANT CHANGE UP
CREAT SERPL-MCNC: 0.87 MG/DL — SIGNIFICANT CHANGE UP (ref 0.5–1.3)
DIFF PNL FLD: NEGATIVE — SIGNIFICANT CHANGE UP
EGFR: 73 ML/MIN/1.73M2 — SIGNIFICANT CHANGE UP
FERRITIN SERPL-MCNC: 62 NG/ML — SIGNIFICANT CHANGE UP (ref 15–150)
GLUCOSE BLDC GLUCOMTR-MCNC: 162 MG/DL — HIGH (ref 70–99)
GLUCOSE BLDC GLUCOMTR-MCNC: 165 MG/DL — HIGH (ref 70–99)
GLUCOSE BLDC GLUCOMTR-MCNC: 179 MG/DL — HIGH (ref 70–99)
GLUCOSE BLDC GLUCOMTR-MCNC: 221 MG/DL — HIGH (ref 70–99)
GLUCOSE SERPL-MCNC: 175 MG/DL — HIGH (ref 70–99)
GLUCOSE UR QL: NEGATIVE — SIGNIFICANT CHANGE UP
HCT VFR BLD CALC: 30.3 % — LOW (ref 34.5–45)
HCV AB S/CO SERPL IA: 0.13 S/CO — SIGNIFICANT CHANGE UP (ref 0–0.99)
HCV AB SERPL-IMP: SIGNIFICANT CHANGE UP
HDLC SERPL-MCNC: 44 MG/DL — LOW
HGB BLD-MCNC: 9.3 G/DL — LOW (ref 11.5–15.5)
IRON SATN MFR SERPL: 27 UG/DL — LOW (ref 40–150)
IRON SATN MFR SERPL: 7 % — LOW (ref 15–50)
KETONES UR-MCNC: NEGATIVE — SIGNIFICANT CHANGE UP
LEUKOCYTE ESTERASE UR-ACNC: NEGATIVE — SIGNIFICANT CHANGE UP
LIPID PNL WITH DIRECT LDL SERPL: 78 MG/DL — SIGNIFICANT CHANGE UP
MAGNESIUM SERPL-MCNC: 1.8 MG/DL — SIGNIFICANT CHANGE UP (ref 1.6–2.6)
MCHC RBC-ENTMCNC: 23.3 PG — LOW (ref 27–34)
MCHC RBC-ENTMCNC: 30.7 GM/DL — LOW (ref 32–36)
MCV RBC AUTO: 75.8 FL — LOW (ref 80–100)
NITRITE UR-MCNC: NEGATIVE — SIGNIFICANT CHANGE UP
NON HDL CHOLESTEROL: 96 MG/DL — SIGNIFICANT CHANGE UP
NRBC # BLD: 0 /100 WBCS — SIGNIFICANT CHANGE UP (ref 0–0)
PH UR: 5 — SIGNIFICANT CHANGE UP (ref 5–8)
PHOSPHATE SERPL-MCNC: 3.6 MG/DL — SIGNIFICANT CHANGE UP (ref 2.5–4.5)
PLATELET # BLD AUTO: 289 K/UL — SIGNIFICANT CHANGE UP (ref 150–400)
POTASSIUM SERPL-MCNC: 3.9 MMOL/L — SIGNIFICANT CHANGE UP (ref 3.5–5.3)
POTASSIUM SERPL-SCNC: 3.9 MMOL/L — SIGNIFICANT CHANGE UP (ref 3.5–5.3)
PROT SERPL-MCNC: 7.6 G/DL — SIGNIFICANT CHANGE UP (ref 6–8.3)
PROT UR-MCNC: NEGATIVE — SIGNIFICANT CHANGE UP
RBC # BLD: 4 M/UL — SIGNIFICANT CHANGE UP (ref 3.8–5.2)
RBC # FLD: 16.6 % — HIGH (ref 10.3–14.5)
SODIUM SERPL-SCNC: 136 MMOL/L — SIGNIFICANT CHANGE UP (ref 135–145)
SP GR SPEC: 1.01 — SIGNIFICANT CHANGE UP (ref 1.01–1.02)
TIBC SERPL-MCNC: 383 UG/DL — SIGNIFICANT CHANGE UP (ref 250–450)
TRIGL SERPL-MCNC: 88 MG/DL — SIGNIFICANT CHANGE UP
TSH SERPL-MCNC: 1.96 UU/ML — SIGNIFICANT CHANGE UP (ref 0.34–4.82)
UIBC SERPL-MCNC: 356 UG/DL — SIGNIFICANT CHANGE UP (ref 110–370)
UROBILINOGEN FLD QL: NEGATIVE — SIGNIFICANT CHANGE UP
WBC # BLD: 8.89 K/UL — SIGNIFICANT CHANGE UP (ref 3.8–10.5)
WBC # FLD AUTO: 8.89 K/UL — SIGNIFICANT CHANGE UP (ref 3.8–10.5)

## 2023-04-15 PROCEDURE — 99233 SBSQ HOSP IP/OBS HIGH 50: CPT

## 2023-04-15 RX ORDER — CARVEDILOL PHOSPHATE 80 MG/1
6.25 CAPSULE, EXTENDED RELEASE ORAL EVERY 12 HOURS
Refills: 0 | Status: DISCONTINUED | OUTPATIENT
Start: 2023-04-15 | End: 2023-04-18

## 2023-04-15 RX ADMIN — Medication 1: at 21:20

## 2023-04-15 RX ADMIN — Medication 6 UNIT(S): at 16:52

## 2023-04-15 RX ADMIN — Medication 1: at 08:06

## 2023-04-15 RX ADMIN — Medication 6 UNIT(S): at 08:05

## 2023-04-15 RX ADMIN — CARVEDILOL PHOSPHATE 3.12 MILLIGRAM(S): 80 CAPSULE, EXTENDED RELEASE ORAL at 06:46

## 2023-04-15 RX ADMIN — Medication 1: at 16:53

## 2023-04-15 RX ADMIN — CARVEDILOL PHOSPHATE 6.25 MILLIGRAM(S): 80 CAPSULE, EXTENDED RELEASE ORAL at 17:02

## 2023-04-15 RX ADMIN — INSULIN GLARGINE 10 UNIT(S): 100 INJECTION, SOLUTION SUBCUTANEOUS at 21:20

## 2023-04-15 RX ADMIN — ENOXAPARIN SODIUM 40 MILLIGRAM(S): 100 INJECTION SUBCUTANEOUS at 15:53

## 2023-04-15 RX ADMIN — Medication 100 MILLIGRAM(S): at 11:40

## 2023-04-15 RX ADMIN — Medication 20 MILLIGRAM(S): at 06:45

## 2023-04-15 RX ADMIN — Medication 20 MILLIGRAM(S): at 13:11

## 2023-04-15 RX ADMIN — Medication 2: at 11:39

## 2023-04-15 RX ADMIN — Medication 6 UNIT(S): at 11:39

## 2023-04-15 RX ADMIN — LISINOPRIL 2.5 MILLIGRAM(S): 2.5 TABLET ORAL at 06:46

## 2023-04-15 RX ADMIN — Medication 81 MILLIGRAM(S): at 11:39

## 2023-04-15 NOTE — PROGRESS NOTE ADULT - PROBLEM SELECTOR PLAN 3
- P/w WBC 11.3 with hypotension 85/70  - Afebrile  - Likely reactive to flash pulmonary edema  - Lactate 4.0 (likely due to flash pulmonary edema cause hypoxia) -> downtrending  - Follow up blood culture

## 2023-04-15 NOTE — PROGRESS NOTE ADULT - SUBJECTIVE AND OBJECTIVE BOX
CHIEF COMPLAINT:Patient is a 67y old  Female who presents with a chief complaint of AHRF secondary to acute cardiogenic pulmonary edema, with valvular involvement. (14 Apr 2023 13:42)    	  REVIEW OF SYSTEMS:  CONSTITUTIONAL: No fever, weight loss, or fatigue  EYES: No eye pain, visual disturbances, or discharge  ENT:  No difficulty hearing, tinnitus, vertigo; No sinus or throat pain  NECK: No pain or stiffness  RESPIRATORY: No cough, wheezing, chills or hemoptysis; No Shortness of Breath  CARDIOVASCULAR: No chest pain, palpitations, passing out, dizziness, or leg swelling  GASTROINTESTINAL: No abdominal or epigastric pain. No nausea, vomiting, or hematemesis; No diarrhea or constipation. No melena or hematochezia.  GENITOURINARY: No dysuria, frequency, hematuria, or incontinence  NEUROLOGICAL: No headaches, memory loss, loss of strength, numbness, or tremors  SKIN: No itching, burning, rashes, or lesions   LYMPH Nodes: No enlarged glands  ENDOCRINE: No heat or cold intolerance; No hair loss  MUSCULOSKELETAL: No joint pain or swelling; No muscle, back, or extremity pain  PSYCHIATRIC: No depression, anxiety, mood swings, or difficulty sleeping  HEME/LYMPH: No easy bruising, or bleeding gums  ALLERGY AND IMMUNOLOGIC: No hives or eczema	    PHYSICAL EXAM:  T(C): 36.7 (04-15-23 @ 07:10), Max: 36.9 (04-14-23 @ 10:54)  HR: 78 (04-15-23 @ 07:10) (76 - 92)  BP: 149/82 (04-15-23 @ 07:10) (121/64 - 149/82)  RR: 18 (04-15-23 @ 07:10) (16 - 18)  SpO2: 97% (04-15-23 @ 07:10) (93% - 98%)  Wt(kg): --  I&O's Summary    15 Apr 2023 07:01  -  15 Apr 2023 10:50  --------------------------------------------------------  IN: 200 mL / OUT: 300 mL / NET: -100 mL        Appearance: Normal	  HEENT:   Normal oral mucosa, PERRL, EOMI	  Lymphatic: No lymphadenopathy  Cardiovascular: Normal S1 S2, No JVD, No murmurs, No edema  Respiratory: Lungs clear to auscultation	  Psychiatry: A & O x 3, Mood & affect appropriate  Gastrointestinal:  Soft, Non-tender, + BS	  Skin: No rashes, No ecchymoses, No cyanosis	  Neurologic: Non-focal  Extremities: Normal range of motion, No clubbing, cyanosis or edema  Vascular: Peripheral pulses palpable 2+ bilaterally    MEDICATIONS  (STANDING):  allopurinol 100 milliGRAM(s) Oral daily  aspirin  chewable 81 milliGRAM(s) Oral daily  carvedilol 6.25 milliGRAM(s) Oral every 12 hours  enoxaparin Injectable 40 milliGRAM(s) SubCutaneous every 24 hours  furosemide   Injectable 20 milliGRAM(s) IV Push two times a day  insulin glargine Injectable (LANTUS) 10 Unit(s) SubCutaneous at bedtime  insulin lispro (ADMELOG) corrective regimen sliding scale   SubCutaneous Before meals and at bedtime  insulin lispro Injectable (ADMELOG) 6 Unit(s) SubCutaneous three times a day before meals  lisinopril 2.5 milliGRAM(s) Oral daily      TELEMETRY: 	    ECG:  	  RADIOLOGY:  OTHER: 	  	  LABS:	 	    CARDIAC MARKERS:      Troponin I, High Sensitivity Result: 14.1 ng/L (04-14 @ 07:40)                            9.3    8.89  )-----------( 289      ( 15 Apr 2023 06:01 )             30.3     04-15    136  |  100  |  19<H>  ----------------------------<  175<H>  3.9   |  27  |  0.87    Ca    9.5      15 Apr 2023 06:01  Phos  3.6     04-15  Mg     1.8     04-15    TPro  7.6  /  Alb  2.9<L>  /  TBili  0.4  /  DBili  x   /  AST  19  /  ALT  23  /  AlkPhos  89  04-15    proBNP:   Lipid Profile: Cholesterol 140  LDL --  HDL 44  TG 88  Ldl calc 78  Ratio --    HgA1c:   TSH: Thyroid Stimulating Hormone, Serum: 1.96 uU/mL (04-15 @ 06:01)      	           CHIEF COMPLAINT:Patient is a 67y old  Female who presents with a chief complaint of AHRF secondary to acute cardiogenic pulmonary edema, with valvular involvement. (14 Apr 2023 13:42)    	  REVIEW OF SYSTEMS:  CONSTITUTIONAL: No fever, weight loss, or fatigue  EYES: No eye pain, visual disturbances, or discharge  ENT:  No difficulty hearing, tinnitus, vertigo; No sinus or throat pain  NECK: No pain or stiffness  RESPIRATORY: No cough, wheezing, chills or hemoptysis; No Shortness of Breath  CARDIOVASCULAR: No chest pain, palpitations, passing out, dizziness, or leg swelling  GASTROINTESTINAL: No abdominal or epigastric pain. No nausea, vomiting, or hematemesis; No diarrhea or constipation. No melena or hematochezia.  GENITOURINARY: No dysuria, frequency, hematuria, or incontinence  NEUROLOGICAL: No headaches, memory loss, loss of strength, numbness, or tremors  SKIN: No itching, burning, rashes, or lesions   LYMPH Nodes: No enlarged glands  ENDOCRINE: No heat or cold intolerance; No hair loss  MUSCULOSKELETAL: No joint pain or swelling; No muscle, back, or extremity pain  PSYCHIATRIC: No depression, anxiety, mood swings, or difficulty sleeping  HEME/LYMPH: No easy bruising, or bleeding gums  ALLERGY AND IMMUNOLOGIC: No hives or eczema	    PHYSICAL EXAM:  T(C): 36.7 (04-15-23 @ 07:10), Max: 36.9 (04-14-23 @ 10:54)  HR: 78 (04-15-23 @ 07:10) (76 - 92)  BP: 149/82 (04-15-23 @ 07:10) (121/64 - 149/82)  RR: 18 (04-15-23 @ 07:10) (16 - 18)  SpO2: 97% (04-15-23 @ 07:10) (93% - 98%)  Wt(kg): --  I&O's Summary    15 Apr 2023 07:01  -  15 Apr 2023 10:50  --------------------------------------------------------  IN: 200 mL / OUT: 300 mL / NET: -100 mL        Appearance: Normal	  HEENT:   Normal oral mucosa, PERRL, EOMI	  Lymphatic: No lymphadenopathy  Cardiovascular: Normal S1 S2, No JVD, No murmurs, No edema  Respiratory: Lungs clear to auscultation	  Psychiatry: A & O x 3, Mood & affect appropriate  Gastrointestinal:  Soft, Non-tender, + BS	  Skin: No rashes, No ecchymoses, No cyanosis	  Neurologic: Non-focal  Extremities: Normal range of motion, No clubbing, cyanosis or edema  Vascular: Peripheral pulses palpable 2+ bilaterally    MEDICATIONS  (STANDING):  allopurinol 100 milliGRAM(s) Oral daily  aspirin  chewable 81 milliGRAM(s) Oral daily  carvedilol 6.25 milliGRAM(s) Oral every 12 hours  enoxaparin Injectable 40 milliGRAM(s) SubCutaneous every 24 hours  furosemide   Injectable 20 milliGRAM(s) IV Push two times a day  insulin glargine Injectable (LANTUS) 10 Unit(s) SubCutaneous at bedtime  insulin lispro (ADMELOG) corrective regimen sliding scale   SubCutaneous Before meals and at bedtime  insulin lispro Injectable (ADMELOG) 6 Unit(s) SubCutaneous three times a day before meals  lisinopril 2.5 milliGRAM(s) Oral daily      TELEMETRY: 	    ECG:  	  RADIOLOGY:  OTHER: 	  	  LABS:	 	    CARDIAC MARKERS:      Troponin I, High Sensitivity Result: 14.1 ng/L (04-14 @ 07:40)                            9.3    8.89  )-----------( 289      ( 15 Apr 2023 06:01 )             30.3     04-15    136  |  100  |  19<H>  ----------------------------<  175<H>  3.9   |  27  |  0.87    Ca    9.5      15 Apr 2023 06:01  Phos  3.6     04-15  Mg     1.8     04-15    TPro  7.6  /  Alb  2.9<L>  /  TBili  0.4  /  DBili  x   /  AST  19  /  ALT  23  /  AlkPhos  89  04-15    proBNP:   Lipid Profile: Cholesterol 140  LDL --  HDL 44  TG 88  Ldl calc 78  Ratio --    HgA1c:   TSH: Thyroid Stimulating Hormone, Serum: 1.96 uU/mL (04-15 @ 06:01)      	    Iron with Total Binding Capacity in AM (04.15.23 @ 06:01)   % Saturation, Iron: 7 %  Iron - Total Binding Capacity.: 383 ug/dL  Iron Total, Serum: 27 ug/dL  Unsaturated Iron Binding Capacity: 356 ug/dL       CHIEF COMPLAINT:Patient is a 67y old  Female who presents with a chief complaint of AHRF secondary to acute cardiogenic pulmonary edema, with valvular involvement. (14 Apr 2023 13:42)    	  REVIEW OF SYSTEMS:  CONSTITUTIONAL: No fever, weight loss, or fatigue  EYES: No eye pain, visual disturbances, or discharge  ENT:  No difficulty hearing, tinnitus, vertigo; No sinus or throat pain  NECK: No pain or stiffness  RESPIRATORY: No cough, wheezing, chills or hemoptysis; No Shortness of Breath  CARDIOVASCULAR: No chest pain, palpitations, passing out, dizziness, or leg swelling  GASTROINTESTINAL: No abdominal or epigastric pain. No nausea, vomiting, or hematemesis; No diarrhea or constipation. No melena or hematochezia.  GENITOURINARY: No dysuria, frequency, hematuria, or incontinence  NEUROLOGICAL: No headaches, memory loss, loss of strength, numbness, or tremors  SKIN: No itching, burning, rashes, or lesions   LYMPH Nodes: No enlarged glands  ENDOCRINE: No heat or cold intolerance; No hair loss  MUSCULOSKELETAL: No joint pain or swelling; No muscle, back, or extremity pain  PSYCHIATRIC: No depression, anxiety, mood swings, or difficulty sleeping  HEME/LYMPH: No easy bruising, or bleeding gums  ALLERGY AND IMMUNOLOGIC: No hives or eczema	    PHYSICAL EXAM:  T(C): 36.7 (04-15-23 @ 07:10), Max: 36.9 (04-14-23 @ 10:54)  HR: 78 (04-15-23 @ 07:10) (76 - 92)  BP: 149/82 (04-15-23 @ 07:10) (121/64 - 149/82)  RR: 18 (04-15-23 @ 07:10) (16 - 18)  SpO2: 97% (04-15-23 @ 07:10) (93% - 98%)  Wt(kg): --  I&O's Summary    15 Apr 2023 07:01  -  15 Apr 2023 10:50  --------------------------------------------------------  IN: 200 mL / OUT: 300 mL / NET: -100 mL        Appearance: Normal	  HEENT:   Normal oral mucosa, PERRL, EOMI	  Lymphatic: No lymphadenopathy  Cardiovascular: Normal S1 S2, 2/6sm  Respiratory: Lungs clear to auscultation	  Psychiatry: A & O x 3, Mood & affect appropriate  Gastrointestinal:  Soft, Non-tender, + BS	  Skin: No rashes, No ecchymoses, No cyanosis	  Neurologic: Non-focal  Extremities: Normal range of motion, No clubbing, cyanosis or edema  Vascular: Peripheral pulses palpable 2+ bilaterally    MEDICATIONS  (STANDING):  allopurinol 100 milliGRAM(s) Oral daily  aspirin  chewable 81 milliGRAM(s) Oral daily  carvedilol 6.25 milliGRAM(s) Oral every 12 hours  enoxaparin Injectable 40 milliGRAM(s) SubCutaneous every 24 hours  furosemide   Injectable 20 milliGRAM(s) IV Push two times a day  insulin glargine Injectable (LANTUS) 10 Unit(s) SubCutaneous at bedtime  insulin lispro (ADMELOG) corrective regimen sliding scale   SubCutaneous Before meals and at bedtime  insulin lispro Injectable (ADMELOG) 6 Unit(s) SubCutaneous three times a day before meals  lisinopril 2.5 milliGRAM(s) Oral daily      TELEMETRY: 	    ECG:  	  RADIOLOGY:  OTHER: 	  	  LABS:	 	    CARDIAC MARKERS:      Troponin I, High Sensitivity Result: 14.1 ng/L (04-14 @ 07:40)                            9.3    8.89  )-----------( 289      ( 15 Apr 2023 06:01 )             30.3     04-15    136  |  100  |  19<H>  ----------------------------<  175<H>  3.9   |  27  |  0.87    Ca    9.5      15 Apr 2023 06:01  Phos  3.6     04-15  Mg     1.8     04-15    TPro  7.6  /  Alb  2.9<L>  /  TBili  0.4  /  DBili  x   /  AST  19  /  ALT  23  /  AlkPhos  89  04-15    proBNP:   Lipid Profile: Cholesterol 140  LDL --  HDL 44  TG 88  Ldl calc 78  Ratio --    HgA1c:   TSH: Thyroid Stimulating Hormone, Serum: 1.96 uU/mL (04-15 @ 06:01)      	    Iron with Total Binding Capacity in AM (04.15.23 @ 06:01)   % Saturation, Iron: 7 %  Iron - Total Binding Capacity.: 383 ug/dL  Iron Total, Serum: 27 ug/dL  Unsaturated Iron Binding Capacity: 356 ug/dL

## 2023-04-15 NOTE — PROGRESS NOTE ADULT - PROBLEM SELECTOR PLAN 1
- 65% on room air -> NRB -> now comfortable on room air  - Likely due to flash cardiogenic pulmonary edema from valvular dysfunction  - CTA chest = No PE. Likely pulmonary edema with small bilateral pleural effusions.  - Management of pulmonary edema and valvular issue below

## 2023-04-15 NOTE — PROGRESS NOTE ADULT - SUBJECTIVE AND OBJECTIVE BOX
Patient is a 67y old  Female who presents with a chief complaint of AHRF secondary to acute cardiogenic pulmonary edema, with valvular involvement. (15 Apr 2023 10:50)      SUBJECTIVE / OVERNIGHT EVENTS: Patient seen and examined at bedside. No acute events overnight. Improved breathing. Weaned off O2.    MEDICATIONS  (STANDING):  allopurinol 100 milliGRAM(s) Oral daily  aspirin  chewable 81 milliGRAM(s) Oral daily  carvedilol 6.25 milliGRAM(s) Oral every 12 hours  enoxaparin Injectable 40 milliGRAM(s) SubCutaneous every 24 hours  furosemide   Injectable 20 milliGRAM(s) IV Push two times a day  insulin glargine Injectable (LANTUS) 10 Unit(s) SubCutaneous at bedtime  insulin lispro (ADMELOG) corrective regimen sliding scale   SubCutaneous Before meals and at bedtime  insulin lispro Injectable (ADMELOG) 6 Unit(s) SubCutaneous three times a day before meals  lisinopril 2.5 milliGRAM(s) Oral daily    MEDICATIONS  (PRN):  acetaminophen     Tablet .. 650 milliGRAM(s) Oral every 6 hours PRN Temp greater or equal to 38C (100.4F), Mild Pain (1 - 3)  aluminum hydroxide/magnesium hydroxide/simethicone Suspension 30 milliLiter(s) Oral every 4 hours PRN Dyspepsia  melatonin 3 milliGRAM(s) Oral at bedtime PRN Insomnia  ondansetron Injectable 4 milliGRAM(s) IV Push every 8 hours PRN Nausea and/or Vomiting      CAPILLARY BLOOD GLUCOSE      POCT Blood Glucose.: 221 mg/dL (15 Apr 2023 11:21)  POCT Blood Glucose.: 179 mg/dL (15 Apr 2023 07:53)  POCT Blood Glucose.: 177 mg/dL (2023 21:53)  POCT Blood Glucose.: 156 mg/dL (2023 19:14)  POCT Blood Glucose.: 182 mg/dL (2023 15:53)    I&O's Summary    15 Apr 2023 07:01  -  15 Apr 2023 12:39  --------------------------------------------------------  IN: 200 mL / OUT: 300 mL / NET: -100 mL        PHYSICAL EXAM:  Vital Signs Last 24 Hrs  T(C): 36.8 (15 Apr 2023 11:01), Max: 36.9 (2023 20:12)  T(F): 98.2 (15 Apr 2023 11:), Max: 98.5 (2023 20:12)  HR: 79 (15 Apr 2023 11:) (76 - 92)  BP: 125/65 (15 Apr 2023 11:) (125/62 - 149/82)  BP(mean): --  RR: 18 (15 Apr 2023 11:) (18 - 18)  SpO2: 98% (15 Apr 2023 11:) (93% - 98%)    Parameters below as of 15 Apr 2023 11:  Patient On (Oxygen Delivery Method): room air        GEN: female in NAD, appears comfortable, no diaphoresis  EYES: No scleral injection, PERRL, EOMI  ENTM: neck supple & symmetric without tracheal deviation, moist membranes, no gross hearing impairment, thyroid gland not enlarged  CV: +S1/S2, 3/6 systolic murmur heard in aortic region, no abdominal bruit, no LE edema  RESP: breathing comfortably, no respiratory accessory muscle use, crackles at bases  GI: normoactive BS, soft, NTND, no rebounding/guarding, no palpable masses    LABS:                        9.3    8.89  )-----------( 289      ( 15 Apr 2023 06:01 )             30.3     04-15    136  |  100  |  19<H>  ----------------------------<  175<H>  3.9   |  27  |  0.87    Ca    9.5      15 Apr 2023 06:01  Phos  3.6     04-15  Mg     1.8     04-15    TPro  7.6  /  Alb  2.9<L>  /  TBili  0.4  /  DBili  x   /  AST  19  /  ALT  23  /  AlkPhos  89  04-15    PT/INR - ( 2023 07:40 )   PT: 12.4 sec;   INR: 1.04 ratio         PTT - ( 2023 07:40 )  PTT:28.5 sec      Urinalysis Basic - ( 15 Apr 2023 08:15 )    Color: Yellow / Appearance: Clear / S.010 / pH: x  Gluc: x / Ketone: Negative  / Bili: Negative / Urobili: Negative   Blood: x / Protein: Negative / Nitrite: Negative   Leuk Esterase: Negative / RBC: x / WBC x   Sq Epi: x / Non Sq Epi: x / Bacteria: x          RADIOLOGY & ADDITIONAL TESTS:  Results Reviewed:   Imaging Personally Reviewed:  Electrocardiogram Personally Reviewed:    COORDINATION OF CARE:  Care Discussed with Consultants/Other Providers [Y/N]:  Prior or Outpatient Records Reviewed [Y/N]:

## 2023-04-15 NOTE — PATIENT PROFILE ADULT - NSPROPTRIGHTSUPPORTPHONE_GEN_A_NUR
Quality 431: Preventive Care And Screening: Unhealthy Alcohol Use - Screening: Patient screened for unhealthy alcohol use using a single question and scores less than 2 times per year
Detail Level: Detailed
Quality 226: Preventive Care And Screening: Tobacco Use: Screening And Cessation Intervention: Patient screened for tobacco use and is an ex/non-smoker
[id number]

## 2023-04-15 NOTE — PROGRESS NOTE ADULT - ASSESSMENT
67 female, from home, with PMHx of DM, HTN, Gout, former smoker, who comes in from a rapid response in the lobby for shortness of breath,acute disatolic HF.  1.Tele monitoring.  2.Echocardiogram.  3.Acute diastolic HF-IV lasix 20mg bid. Second episode of pulmonary edema-2/23 at [facility].  4.HTN-low dose ace and inc coreg 6.25mg bid.  5.DM-Insulin.  6.Cont asa,statin.  7.GI and DVT prophylaxis. 67 female, from home, with PMHx of DM, HTN, Gout, former smoker, who comes in from a rapid response in the lobby for shortness of breath,acute disatolic HF.  1.Tele monitoring.  2.Echocardiogram.  3.Acute diastolic HF-IV lasix 20mg bid. Second episode of pulmonary edema-2/23 at [facility].  4.HTN-low dose ace and inc coreg 6.25mg bid.  5.DM-Insulin.  6.Cont asa,statin.  7.Fe def anemia-check occult, Iron.  8.GI and DVT prophylaxis. n/a

## 2023-04-15 NOTE — PROGRESS NOTE ADULT - PROBLEM SELECTOR PLAN 5
- Home med Humalog 18U BID, Metformin 500 BID, Ozempic 0.25 weekly  - Adding Admelog 6U TID premeals + 10U Lantus  - ISS + FS achs  - Titrate up as needed

## 2023-04-15 NOTE — PROGRESS NOTE ADULT - PROBLEM SELECTOR PLAN 4
- Home meds Amlodipine 5mg, Metoprolol Succinate 25mg ER, Enalapril 20mg daily  - Cardio Dr. Hall = Coreg 6.25 mg BID, Lisinopril 2.5 daily

## 2023-04-16 DIAGNOSIS — D50.9 IRON DEFICIENCY ANEMIA, UNSPECIFIED: ICD-10-CM

## 2023-04-16 LAB
ALBUMIN SERPL ELPH-MCNC: 2.9 G/DL — LOW (ref 3.5–5)
ALP SERPL-CCNC: 90 U/L — SIGNIFICANT CHANGE UP (ref 40–120)
ALT FLD-CCNC: 27 U/L DA — SIGNIFICANT CHANGE UP (ref 10–60)
ANION GAP SERPL CALC-SCNC: 8 MMOL/L — SIGNIFICANT CHANGE UP (ref 5–17)
AST SERPL-CCNC: 27 U/L — SIGNIFICANT CHANGE UP (ref 10–40)
BILIRUB SERPL-MCNC: 0.4 MG/DL — SIGNIFICANT CHANGE UP (ref 0.2–1.2)
BUN SERPL-MCNC: 22 MG/DL — HIGH (ref 7–18)
CALCIUM SERPL-MCNC: 9.4 MG/DL — SIGNIFICANT CHANGE UP (ref 8.4–10.5)
CHLORIDE SERPL-SCNC: 101 MMOL/L — SIGNIFICANT CHANGE UP (ref 96–108)
CO2 SERPL-SCNC: 28 MMOL/L — SIGNIFICANT CHANGE UP (ref 22–31)
CREAT SERPL-MCNC: 0.93 MG/DL — SIGNIFICANT CHANGE UP (ref 0.5–1.3)
EGFR: 67 ML/MIN/1.73M2 — SIGNIFICANT CHANGE UP
GLUCOSE BLDC GLUCOMTR-MCNC: 179 MG/DL — HIGH (ref 70–99)
GLUCOSE BLDC GLUCOMTR-MCNC: 182 MG/DL — HIGH (ref 70–99)
GLUCOSE BLDC GLUCOMTR-MCNC: 193 MG/DL — HIGH (ref 70–99)
GLUCOSE BLDC GLUCOMTR-MCNC: 198 MG/DL — HIGH (ref 70–99)
GLUCOSE SERPL-MCNC: 181 MG/DL — HIGH (ref 70–99)
HCT VFR BLD CALC: 31 % — LOW (ref 34.5–45)
HGB BLD-MCNC: 9.7 G/DL — LOW (ref 11.5–15.5)
MCHC RBC-ENTMCNC: 23.7 PG — LOW (ref 27–34)
MCHC RBC-ENTMCNC: 31.3 GM/DL — LOW (ref 32–36)
MCV RBC AUTO: 75.8 FL — LOW (ref 80–100)
NRBC # BLD: 0 /100 WBCS — SIGNIFICANT CHANGE UP (ref 0–0)
OB PNL STL: NEGATIVE — SIGNIFICANT CHANGE UP
PLATELET # BLD AUTO: 324 K/UL — SIGNIFICANT CHANGE UP (ref 150–400)
POTASSIUM SERPL-MCNC: 3.5 MMOL/L — SIGNIFICANT CHANGE UP (ref 3.5–5.3)
POTASSIUM SERPL-SCNC: 3.5 MMOL/L — SIGNIFICANT CHANGE UP (ref 3.5–5.3)
PROT SERPL-MCNC: 7.8 G/DL — SIGNIFICANT CHANGE UP (ref 6–8.3)
RBC # BLD: 4.09 M/UL — SIGNIFICANT CHANGE UP (ref 3.8–5.2)
RBC # FLD: 16.5 % — HIGH (ref 10.3–14.5)
SODIUM SERPL-SCNC: 137 MMOL/L — SIGNIFICANT CHANGE UP (ref 135–145)
WBC # BLD: 8.72 K/UL — SIGNIFICANT CHANGE UP (ref 3.8–10.5)
WBC # FLD AUTO: 8.72 K/UL — SIGNIFICANT CHANGE UP (ref 3.8–10.5)

## 2023-04-16 PROCEDURE — 99233 SBSQ HOSP IP/OBS HIGH 50: CPT | Mod: GC

## 2023-04-16 RX ORDER — FUROSEMIDE 40 MG
20 TABLET ORAL DAILY
Refills: 0 | Status: DISCONTINUED | OUTPATIENT
Start: 2023-04-16 | End: 2023-04-18

## 2023-04-16 RX ADMIN — Medication 1: at 21:46

## 2023-04-16 RX ADMIN — CARVEDILOL PHOSPHATE 6.25 MILLIGRAM(S): 80 CAPSULE, EXTENDED RELEASE ORAL at 17:02

## 2023-04-16 RX ADMIN — Medication 81 MILLIGRAM(S): at 11:33

## 2023-04-16 RX ADMIN — Medication 6 UNIT(S): at 11:32

## 2023-04-16 RX ADMIN — Medication 20 MILLIGRAM(S): at 05:46

## 2023-04-16 RX ADMIN — ENOXAPARIN SODIUM 40 MILLIGRAM(S): 100 INJECTION SUBCUTANEOUS at 15:54

## 2023-04-16 RX ADMIN — Medication 1: at 11:33

## 2023-04-16 RX ADMIN — INSULIN GLARGINE 10 UNIT(S): 100 INJECTION, SOLUTION SUBCUTANEOUS at 21:46

## 2023-04-16 RX ADMIN — Medication 6 UNIT(S): at 17:00

## 2023-04-16 RX ADMIN — LISINOPRIL 2.5 MILLIGRAM(S): 2.5 TABLET ORAL at 05:46

## 2023-04-16 RX ADMIN — Medication 100 MILLIGRAM(S): at 11:33

## 2023-04-16 RX ADMIN — Medication 1: at 07:56

## 2023-04-16 RX ADMIN — Medication 1: at 17:00

## 2023-04-16 RX ADMIN — CARVEDILOL PHOSPHATE 6.25 MILLIGRAM(S): 80 CAPSULE, EXTENDED RELEASE ORAL at 05:47

## 2023-04-16 RX ADMIN — Medication 6 UNIT(S): at 07:56

## 2023-04-16 NOTE — PROGRESS NOTE ADULT - PROBLEM SELECTOR PLAN 3
- P/w WBC 11.3 with hypotension 85/70  - Afebrile  - Likely reactive to flash pulmonary edema  - Lactate 4.0 (likely due to flash pulmonary edema cause hypoxia) -> downtrending 3.5->2.8  - BCx: NGTD  - WBC WNL

## 2023-04-16 NOTE — PROGRESS NOTE ADULT - PROBLEM SELECTOR PLAN 2
- continue with diureses  - switched lasix 20 mg IV from BID to QD  - Cardiology consulted: Isabel  - TTE prelim normal EF, mild AS and MS  - Plan for stress test on 4/17 as AS and MS would not explain flash pulm edema

## 2023-04-16 NOTE — PROGRESS NOTE ADULT - PROBLEM SELECTOR PLAN 1
- 65% on room air -> NRB -> now comfortable on room air  - Likely due to flash cardiogenic pulmonary edema from valvular dysfunction  - CTA chest = No PE. Likely pulmonary edema with small bilateral pleural effusions.  - Management of pulmonary edema and valvular issue below  -c/w Lasix 20mg IV qd

## 2023-04-16 NOTE — PROGRESS NOTE ADULT - PROBLEM SELECTOR PLAN 4
- Home meds Amlodipine 5mg, Metoprolol Succinate 25mg ER, Enalapril 20mg daily  - Cardio Dr. 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

## 2023-04-16 NOTE — PROGRESS NOTE ADULT - SUBJECTIVE AND OBJECTIVE BOX
CHIEF COMPLAINT:Patient is a 67y old  Female who presents with a chief complaint of AHRF secondary to acute cardiogenic pulmonary edema, with valvular involvement.    	  REVIEW OF SYSTEMS:  CONSTITUTIONAL: No fever, weight loss, or fatigue  EYES: No eye pain, visual disturbances, or discharge  ENT:  No difficulty hearing, tinnitus, vertigo; No sinus or throat pain  NECK: No pain or stiffness  RESPIRATORY: No cough, wheezing, chills or hemoptysis; No Shortness of Breath  CARDIOVASCULAR: No chest pain, palpitations, passing out, dizziness, or leg swelling  GASTROINTESTINAL: No abdominal or epigastric pain. No nausea, vomiting, or hematemesis; No diarrhea or constipation. No melena or hematochezia.  GENITOURINARY: No dysuria, frequency, hematuria, or incontinence  NEUROLOGICAL: No headaches, memory loss, loss of strength, numbness, or tremors  SKIN: No itching, burning, rashes, or lesions   LYMPH Nodes: No enlarged glands  ENDOCRINE: No heat or cold intolerance; No hair loss  MUSCULOSKELETAL: No joint pain or swelling; No muscle, back, or extremity pain  PSYCHIATRIC: No depression, anxiety, mood swings, or difficulty sleeping  HEME/LYMPH: No easy bruising, or bleeding gums  ALLERGY AND IMMUNOLOGIC: No hives or eczema	        PHYSICAL EXAM:  T(C): 36.9 (04-16-23 @ 07:37), Max: 37 (04-16-23 @ 00:11)  HR: 67 (04-16-23 @ 09:10) (64 - 81)  BP: 128/79 (04-16-23 @ 09:10) (123/72 - 151/64)  RR: 18 (04-16-23 @ 09:10) (17 - 18)  SpO2: 100% (04-16-23 @ 09:10) (95% - 100%)  Wt(kg): --  I&O's Summary    15 Apr 2023 07:01  -  16 Apr 2023 07:00  --------------------------------------------------------  IN: 1530 mL / OUT: 2300 mL / NET: -770 mL    16 Apr 2023 07:01  -  16 Apr 2023 10:12  --------------------------------------------------------  IN: 200 mL / OUT: 1000 mL / NET: -800 mL        Appearance: Normal	  HEENT:   Normal oral mucosa, PERRL, EOMI	  Lymphatic: No lymphadenopathy  Cardiovascular: Normal S1 S2, No JVD, No murmurs, No edema  Respiratory: Lungs clear to auscultation	  Psychiatry: A & O x 3, Mood & affect appropriate  Gastrointestinal:  Soft, Non-tender, + BS	  Skin: No rashes, No ecchymoses, No cyanosis	  Neurologic: Non-focal  Extremities: Normal range of motion, No clubbing, cyanosis or edema  Vascular: Peripheral pulses palpable 2+ bilaterally    MEDICATIONS  (STANDING):  allopurinol 100 milliGRAM(s) Oral daily  aspirin  chewable 81 milliGRAM(s) Oral daily  carvedilol 6.25 milliGRAM(s) Oral every 12 hours  enoxaparin Injectable 40 milliGRAM(s) SubCutaneous every 24 hours  furosemide   Injectable 20 milliGRAM(s) IV Push two times a day  insulin glargine Injectable (LANTUS) 10 Unit(s) SubCutaneous at bedtime  insulin lispro (ADMELOG) corrective regimen sliding scale   SubCutaneous Before meals and at bedtime  insulin lispro Injectable (ADMELOG) 6 Unit(s) SubCutaneous three times a day before meals  lisinopril 2.5 milliGRAM(s) Oral daily      TELEMETRY: nsr	     	  	  LABS:	 	    Troponin I, High Sensitivity Result: 14.1 ng/L (04-14 @ 07:40)                            9.7    8.72  )-----------( 324      ( 16 Apr 2023 06:34 )             31.0     04-16    137  |  101  |  22<H>  ----------------------------<  181<H>  3.5   |  28  |  0.93    Ca    9.4      16 Apr 2023 06:34  Phos  3.6     04-15  Mg     1.8     04-15    TPro  7.8  /  Alb  2.9<L>  /  TBili  0.4  /  DBili  x   /  AST  27  /  ALT  27  /  AlkPhos  90  04-16      Lipid Profile: Cholesterol 140  LDL --  HDL 44  TG 88  Ldl calc 78    TSH: Thyroid Stimulating Hormone, Serum: 1.96 uU/mL (04-15 @ 06:01)      	           CHIEF COMPLAINT:Patient is a 67y old  Female who presents with a chief complaint of AHRF secondary to acute cardiogenic pulmonary edema, with valvular involvement.    	  REVIEW OF SYSTEMS:  CONSTITUTIONAL: No fever, weight loss, or fatigue  EYES: No eye pain, visual disturbances, or discharge  ENT:  No difficulty hearing, tinnitus, vertigo; No sinus or throat pain  NECK: No pain or stiffness  RESPIRATORY: No cough, wheezing, chills or hemoptysis; No Shortness of Breath  CARDIOVASCULAR: No chest pain, palpitations, passing out, dizziness, or leg swelling  GASTROINTESTINAL: No abdominal or epigastric pain. No nausea, vomiting, or hematemesis; No diarrhea or constipation. No melena or hematochezia.  GENITOURINARY: No dysuria, frequency, hematuria, or incontinence  NEUROLOGICAL: No headaches, memory loss, loss of strength, numbness, or tremors  SKIN: No itching, burning, rashes, or lesions   LYMPH Nodes: No enlarged glands  ENDOCRINE: No heat or cold intolerance; No hair loss  MUSCULOSKELETAL: No joint pain or swelling; No muscle, back, or extremity pain  PSYCHIATRIC: No depression, anxiety, mood swings, or difficulty sleeping  HEME/LYMPH: No easy bruising, or bleeding gums  ALLERGY AND IMMUNOLOGIC: No hives or eczema	        PHYSICAL EXAM:  T(C): 36.9 (04-16-23 @ 07:37), Max: 37 (04-16-23 @ 00:11)  HR: 67 (04-16-23 @ 09:10) (64 - 81)  BP: 128/79 (04-16-23 @ 09:10) (123/72 - 151/64)  RR: 18 (04-16-23 @ 09:10) (17 - 18)  SpO2: 100% (04-16-23 @ 09:10) (95% - 100%)  Wt(kg): --  I&O's Summary    15 Apr 2023 07:01  -  16 Apr 2023 07:00  --------------------------------------------------------  IN: 1530 mL / OUT: 2300 mL / NET: -770 mL    16 Apr 2023 07:01  -  16 Apr 2023 10:12  --------------------------------------------------------  IN: 200 mL / OUT: 1000 mL / NET: -800 mL        Appearance: Normal	  HEENT:   Normal oral mucosa, PERRL, EOMI	  Lymphatic: No lymphadenopathy  Cardiovascular: Normal S1 S2, 2/6sm  Respiratory: Lungs clear to auscultation	  Psychiatry: A & O x 3, Mood & affect appropriate  Gastrointestinal:  Soft, Non-tender, + BS	  Skin: No rashes, No ecchymoses, No cyanosis	  Neurologic: Non-focal  Extremities: Normal range of motion, No clubbing, cyanosis or edema  Vascular: Peripheral pulses palpable 2+ bilaterally    MEDICATIONS  (STANDING):  allopurinol 100 milliGRAM(s) Oral daily  aspirin  chewable 81 milliGRAM(s) Oral daily  carvedilol 6.25 milliGRAM(s) Oral every 12 hours  enoxaparin Injectable 40 milliGRAM(s) SubCutaneous every 24 hours  furosemide   Injectable 20 milliGRAM(s) IV Push two times a day  insulin glargine Injectable (LANTUS) 10 Unit(s) SubCutaneous at bedtime  insulin lispro (ADMELOG) corrective regimen sliding scale   SubCutaneous Before meals and at bedtime  insulin lispro Injectable (ADMELOG) 6 Unit(s) SubCutaneous three times a day before meals  lisinopril 2.5 milliGRAM(s) Oral daily      TELEMETRY: nsr	     	  	  LABS:	 	    Troponin I, High Sensitivity Result: 14.1 ng/L (04-14 @ 07:40)                            9.7    8.72  )-----------( 324      ( 16 Apr 2023 06:34 )             31.0     04-16    137  |  101  |  22<H>  ----------------------------<  181<H>  3.5   |  28  |  0.93    Ca    9.4      16 Apr 2023 06:34  Phos  3.6     04-15  Mg     1.8     04-15    TPro  7.8  /  Alb  2.9<L>  /  TBili  0.4  /  DBili  x   /  AST  27  /  ALT  27  /  AlkPhos  90  04-16      Lipid Profile: Cholesterol 140  LDL --  HDL 44  TG 88  Ldl calc 78    TSH: Thyroid Stimulating Hormone, Serum: 1.96 uU/mL (04-15 @ 06:01)

## 2023-04-16 NOTE — PROGRESS NOTE ADULT - SUBJECTIVE AND OBJECTIVE BOX
PGY-1 Progress Note discussed with attending    PAGER #: [[phone]] TILL 5:00 PM  PLEASE CONTACT ON CALL TEAM:  - On Call Team (Please refer to Kathleen) FROM 5:00 PM - 8:30PM  - Nightfloat Team FROM 8:30 -7:30 AM    CC: Patient is a 67y old  Female who presents with a chief complaint of AHRF secondary to acute cardiogenic pulmonary edema, with valvular involvement. (2023 10:11)      OVERNIGHT EVENTS:    SUBJECTIVE / INTERVAL HPI: Patient seen and examined at bedside. Says she is feeling much better today. No acute complaints currently. Expresses concern over abdominal distention and what she thinks feels like a mass at right clavicle. Endorses some thinning of hair. Denies frequent diaphoresis, anxiety or palpitations. Denies, headache, chest pain, shortness of breath, abdominal pain, nausea, vomiting, diarrhea, constipation, and dysuria.     ROS:  CONSTITUTIONAL: No weakness, fevers or chills  EYES/ENT: No visual changes;  No vertigo or throat pain   NECK: No pain or stiffness  RESPIRATORY: No cough, wheezing, hemoptysis; No shortness of breath  CARDIOVASCULAR: No chest pain or palpitations  GASTROINTESTINAL: No abdominal or epigastric pain. No nausea, vomiting, or hematemesis; No diarrhea or constipation. No melena or hematochezia.  GENITOURINARY: No dysuria, frequency or hematuria  NEUROLOGICAL: No numbness or weakness  SKIN: No itching, burning, rashes, or lesions     VITAL SIGNS:  Vital Signs Last 24 Hrs  T(C): 36.9 (2023 07:37), Max: 37 (2023 00:11)  T(F): 98.4 (2023 07:37), Max: 98.6 (2023 00:11)  HR: 67 (2023 09:10) (64 - 81)  BP: 128/79 (2023 09:10) (123/72 - 151/64)  BP(mean): 88 (15 Apr 2023 13:10) (88 - 88)  RR: 18 (2023 09:10) (17 - 18)  SpO2: 100% (2023 09:10) (95% - 100%)    Parameters below as of 2023 09:10  Patient On (Oxygen Delivery Method): room air        PHYSICAL EXAM:    General: WDWN  HEENT: NC/AT; PERRL, anicteric sclera; MMM  Neck: supple, +supraclavicular and cervical fat pads  Cardiovascular: 4/5 systolic murmur heard best at LUSB, +S1/S2; RRR  Respiratory: fine rales noted at right base, CTA on left; no Wheezes or Rhonchi  Gastrointestinal: soft, NT/ distended; +BSx4  Extremities: WWP; no edema, clubbing or cyanosis  Vascular: 2+ radial, DP/PT pulses B/L  Skin: Warm, dry, good turgor, no rashes, +ecchymosis L forearm, some axillary and abdominal striae  Neurological: AAOx3; no focal deficits    MEDICATIONS:  MEDICATIONS  (STANDING):  allopurinol 100 milliGRAM(s) Oral daily  aspirin  chewable 81 milliGRAM(s) Oral daily  carvedilol 6.25 milliGRAM(s) Oral every 12 hours  enoxaparin Injectable 40 milliGRAM(s) SubCutaneous every 24 hours  furosemide   Injectable 20 milliGRAM(s) IV Push daily  insulin glargine Injectable (LANTUS) 10 Unit(s) SubCutaneous at bedtime  insulin lispro (ADMELOG) corrective regimen sliding scale   SubCutaneous Before meals and at bedtime  insulin lispro Injectable (ADMELOG) 6 Unit(s) SubCutaneous three times a day before meals  lisinopril 2.5 milliGRAM(s) Oral daily    MEDICATIONS  (PRN):  acetaminophen     Tablet .. 650 milliGRAM(s) Oral every 6 hours PRN Temp greater or equal to 38C (100.4F), Mild Pain (1 - 3)  aluminum hydroxide/magnesium hydroxide/simethicone Suspension 30 milliLiter(s) Oral every 4 hours PRN Dyspepsia  melatonin 3 milliGRAM(s) Oral at bedtime PRN Insomnia  ondansetron Injectable 4 milliGRAM(s) IV Push every 8 hours PRN Nausea and/or Vomiting      ALLERGIES:  Allergies    No Known Drug Allergies  DE (Anaphylaxis)    Intolerances        LABS:                        9.7    8.72  )-----------( 324      ( 2023 06:34 )             31.0     04-16    137  |  101  |  22<H>  ----------------------------<  181<H>  3.5   |  28  |  0.93    Ca    9.4      2023 06:34  Phos  3.6     04-15  Mg     1.8     04-15    TPro  7.8  /  Alb  2.9<L>  /  TBili  0.4  /  DBili  x   /  AST  27  /  ALT  27  /  AlkPhos  90  04-16      Urinalysis Basic - ( 15 Apr 2023 08:15 )    Color: Yellow / Appearance: Clear / S.010 / pH: x  Gluc: x / Ketone: Negative  / Bili: Negative / Urobili: Negative   Blood: x / Protein: Negative / Nitrite: Negative   Leuk Esterase: Negative / RBC: x / WBC x   Sq Epi: x / Non Sq Epi: x / Bacteria: x      CAPILLARY BLOOD GLUCOSE      POCT Blood Glucose.: 198 mg/dL (2023 11:21)      RADIOLOGY & ADDITIONAL TESTS:   < from: CT Angio Chest PE Protocol w/ IV Cont (23 @ 10:42) >  INTERPRETATION:  HISTORY: Shortness of breath.    EXAMINATION: CTA CHEST was performed for evaluation of the pulmonary   arteries. Multiplanar reformatted images and MIPS were acquired.  CONTRAST/COMPLICATIONS:  IV Contrast: Omnipaque 350  70 cc administered   30 cc discarded  Oral Contrast: NONE  Complications: None reported at time of study completion    COMPARISON: 2016.    FINDINGS:    PULMONARY ARTERIES: No pulmonary embolism.    MEDIASTINUM: Mitral annular calcifications. Dilated left atrium. Coronary   atherosclerosis. No pericardial effusion. Ascending thoracic aorta   measures 3.9 cm. Mediastinal bilateral hilar lymphadenopathy appears   similar compared to 1116; reference subcarinal 2.6 x 1.7 cm node (image   227, series 4).    AIRWAYS, LUNGS, PLEURA: Clear central airways. Emphysema. Hazy   ground-glass opacities throughout the lungs bilaterally.Interlobular   septal thickening. Small bilateral pleural effusions (right greater than   left) with associated right basilar atelectasis.    IMAGED ABDOMEN: Surface contour nodularity of the liver. Cholecystectomy.   Left adrenal myelolipoma. Few prominent upper abdominal nodes as on   2016.    SOFT TISSUES: Anterior chest wall collateral vessels.    BONES: Unremarkable.      IMPRESSION:.    No pulmonary embolism.    Likely pulmonary edema with small bilateral pleural effusions.    Surface contour nodularity of the liver is suggested; possibility of   chronic liver disease is raised and recommend correlation with LFTs.      < end of copied text >  < from: Xray Chest 1 View-PORTABLE IMMEDIATE (Xray Chest 1 View-PORTABLE IMMEDIATE .) (23 @ 08:35) >  INTERPRETATION:  AP erect chest on 2023 at 7:43 AM. Patient is   short of breath.    Heart magnified by technique.    There is a diffuse symmetric moderately advanced congestive like picture   significantly increased from 2016.    IMPRESSION: Symmetrically fairly advanced diffuse infiltrates consistent   with CHF.    < end of copied text >

## 2023-04-16 NOTE — PROGRESS NOTE ADULT - PROBLEM SELECTOR PLAN 5
p/w Hbg of 10.6 w/ low MCV  -Today Hbg 9.7   -Total Iron 27, %sat 7  -No signs of bleeding  -monitor CBC  -Consider PO iron supplement (patient worried about constipation)

## 2023-04-16 NOTE — PROGRESS NOTE ADULT - ASSESSMENT
67 female, from home, with PMHx of DM, HTN, Gout, former smoker, who comes in from a rapid response in the lobby for shortness of breath,acute disatolic HF.  1.Tele monitoring.  2.Echocardiogram prelim nl fx,mild as and ms which would not cause flash pulmonary edema, will have stress test in am.  3.Acute diastolic HF-dec IV lasix 20mg qd. Second episode of pulmonary edema-2/23 at [facility].  4.HTN-low dose ace and coreg 6.25mg bid.  5.DM-Insulin.  6.Cont asa,statin.  7.Fe def anemia-check occult, Iron.  8.GI and DVT prophylaxis.

## 2023-04-17 LAB
ALBUMIN SERPL ELPH-MCNC: 3.3 G/DL — LOW (ref 3.5–5)
ALP SERPL-CCNC: 91 U/L — SIGNIFICANT CHANGE UP (ref 40–120)
ALT FLD-CCNC: 35 U/L DA — SIGNIFICANT CHANGE UP (ref 10–60)
ANION GAP SERPL CALC-SCNC: 7 MMOL/L — SIGNIFICANT CHANGE UP (ref 5–17)
AST SERPL-CCNC: 33 U/L — SIGNIFICANT CHANGE UP (ref 10–40)
BILIRUB SERPL-MCNC: 0.4 MG/DL — SIGNIFICANT CHANGE UP (ref 0.2–1.2)
BUN SERPL-MCNC: 23 MG/DL — HIGH (ref 7–18)
CALCIUM SERPL-MCNC: 9.6 MG/DL — SIGNIFICANT CHANGE UP (ref 8.4–10.5)
CHLORIDE SERPL-SCNC: 102 MMOL/L — SIGNIFICANT CHANGE UP (ref 96–108)
CO2 SERPL-SCNC: 28 MMOL/L — SIGNIFICANT CHANGE UP (ref 22–31)
CREAT SERPL-MCNC: 0.83 MG/DL — SIGNIFICANT CHANGE UP (ref 0.5–1.3)
EGFR: 77 ML/MIN/1.73M2 — SIGNIFICANT CHANGE UP
GLUCOSE BLDC GLUCOMTR-MCNC: 154 MG/DL — HIGH (ref 70–99)
GLUCOSE BLDC GLUCOMTR-MCNC: 188 MG/DL — HIGH (ref 70–99)
GLUCOSE BLDC GLUCOMTR-MCNC: 191 MG/DL — HIGH (ref 70–99)
GLUCOSE BLDC GLUCOMTR-MCNC: 207 MG/DL — HIGH (ref 70–99)
GLUCOSE SERPL-MCNC: 201 MG/DL — HIGH (ref 70–99)
HCT VFR BLD CALC: 33.9 % — LOW (ref 34.5–45)
HGB BLD-MCNC: 10.6 G/DL — LOW (ref 11.5–15.5)
MAGNESIUM SERPL-MCNC: 2 MG/DL — SIGNIFICANT CHANGE UP (ref 1.6–2.6)
MCHC RBC-ENTMCNC: 23.7 PG — LOW (ref 27–34)
MCHC RBC-ENTMCNC: 31.3 GM/DL — LOW (ref 32–36)
MCV RBC AUTO: 75.7 FL — LOW (ref 80–100)
NRBC # BLD: 0 /100 WBCS — SIGNIFICANT CHANGE UP (ref 0–0)
PHOSPHATE SERPL-MCNC: 3.7 MG/DL — SIGNIFICANT CHANGE UP (ref 2.5–4.5)
PLATELET # BLD AUTO: 357 K/UL — SIGNIFICANT CHANGE UP (ref 150–400)
POTASSIUM SERPL-MCNC: 4.2 MMOL/L — SIGNIFICANT CHANGE UP (ref 3.5–5.3)
POTASSIUM SERPL-SCNC: 4.2 MMOL/L — SIGNIFICANT CHANGE UP (ref 3.5–5.3)
PROT SERPL-MCNC: 8.5 G/DL — HIGH (ref 6–8.3)
RBC # BLD: 4.48 M/UL — SIGNIFICANT CHANGE UP (ref 3.8–5.2)
RBC # FLD: 16.8 % — HIGH (ref 10.3–14.5)
SODIUM SERPL-SCNC: 137 MMOL/L — SIGNIFICANT CHANGE UP (ref 135–145)
WBC # BLD: 8.89 K/UL — SIGNIFICANT CHANGE UP (ref 3.8–10.5)
WBC # FLD AUTO: 8.89 K/UL — SIGNIFICANT CHANGE UP (ref 3.8–10.5)

## 2023-04-17 PROCEDURE — 99233 SBSQ HOSP IP/OBS HIGH 50: CPT | Mod: GC

## 2023-04-17 RX ADMIN — Medication 100 MILLIGRAM(S): at 11:11

## 2023-04-17 RX ADMIN — Medication 6 UNIT(S): at 11:02

## 2023-04-17 RX ADMIN — Medication 20 MILLIGRAM(S): at 05:34

## 2023-04-17 RX ADMIN — CARVEDILOL PHOSPHATE 6.25 MILLIGRAM(S): 80 CAPSULE, EXTENDED RELEASE ORAL at 05:34

## 2023-04-17 RX ADMIN — INSULIN GLARGINE 10 UNIT(S): 100 INJECTION, SOLUTION SUBCUTANEOUS at 21:39

## 2023-04-17 RX ADMIN — Medication 1: at 21:39

## 2023-04-17 RX ADMIN — Medication 81 MILLIGRAM(S): at 11:11

## 2023-04-17 RX ADMIN — Medication 6 UNIT(S): at 17:10

## 2023-04-17 RX ADMIN — CARVEDILOL PHOSPHATE 6.25 MILLIGRAM(S): 80 CAPSULE, EXTENDED RELEASE ORAL at 17:09

## 2023-04-17 RX ADMIN — ENOXAPARIN SODIUM 40 MILLIGRAM(S): 100 INJECTION SUBCUTANEOUS at 17:09

## 2023-04-17 RX ADMIN — LISINOPRIL 2.5 MILLIGRAM(S): 2.5 TABLET ORAL at 05:34

## 2023-04-17 RX ADMIN — Medication 2: at 11:02

## 2023-04-17 RX ADMIN — Medication 1: at 17:10

## 2023-04-17 NOTE — PROGRESS NOTE ADULT - ASSESSMENT
67 female, from home, with PMHx of DM, HTN, Gout, former smoker, who comes in from a rapid response in the lobby for shortness of breath,acute disatolic HF.  1.Tele monitoring.  2.Echocardiogram prelim nl fx,mild as and ms which would not cause flash pulmonary edema, will have stress test today.  3.Acute diastolic HF-dec IV lasix 20mg qd. Second episode of pulmonary edema-2/23 at [facility].  4.HTN-low dose ace and coreg 6.25mg bid.  5.DM-Insulin.  6.Cont asa,statin.  7.Fe def anemia- occult is neg, Iron.  8.GI and DVT prophylaxis.

## 2023-04-17 NOTE — PROGRESS NOTE ADULT - SUBJECTIVE AND OBJECTIVE BOX
PGY-1 Progress Note discussed with attending    INTERVAL HPI/OVERNIGHT EVENTS:   MEDICATIONS  (STANDING):  allopurinol 100 milliGRAM(s) Oral daily  aspirin  chewable 81 milliGRAM(s) Oral daily  carvedilol 6.25 milliGRAM(s) Oral every 12 hours  enoxaparin Injectable 40 milliGRAM(s) SubCutaneous every 24 hours  furosemide   Injectable 20 milliGRAM(s) IV Push daily  insulin glargine Injectable (LANTUS) 10 Unit(s) SubCutaneous at bedtime  insulin lispro (ADMELOG) corrective regimen sliding scale   SubCutaneous Before meals and at bedtime  insulin lispro Injectable (ADMELOG) 6 Unit(s) SubCutaneous three times a day before meals  lisinopril 2.5 milliGRAM(s) Oral daily    MEDICATIONS  (PRN):  acetaminophen     Tablet .. 650 milliGRAM(s) Oral every 6 hours PRN Temp greater or equal to 38C (100.4F), Mild Pain (1 - 3)  aluminum hydroxide/magnesium hydroxide/simethicone Suspension 30 milliLiter(s) Oral every 4 hours PRN Dyspepsia  melatonin 3 milliGRAM(s) Oral at bedtime PRN Insomnia  ondansetron Injectable 4 milliGRAM(s) IV Push every 8 hours PRN Nausea and/or Vomiting      REVIEW OF SYSTEMS:  CONSTITUTIONAL: No fever, weight loss, or fatigue  RESPIRATORY: No cough, wheezing, chills or hemoptysis; No shortness of breath  CARDIOVASCULAR: No chest pain, palpitations, dizziness, or leg swelling  GASTROINTESTINAL: No abdominal pain. No nausea, vomiting, or hematemesis; No diarrhea or constipation. No melena or hematochezia.  GENITOURINARY: No dysuria or hematuria, urinary frequency  NEUROLOGICAL: No headaches, memory loss, loss of strength, numbness, or tremors  SKIN: No itching, burning, rashes, or lesions     Vital Signs Last 24 Hrs  T(C): 36.8 (17 Apr 2023 07:07), Max: 36.9 (16 Apr 2023 23:52)  T(F): 98.2 (17 Apr 2023 07:07), Max: 98.4 (16 Apr 2023 23:52)  HR: 70 (17 Apr 2023 07:07) (69 - 72)  BP: 121/56 (17 Apr 2023 07:07) (121/56 - 146/80)  BP(mean): --  RR: 19 (17 Apr 2023 07:07) (18 - 19)  SpO2: 94% (17 Apr 2023 07:07) (94% - 100%)    Parameters below as of 17 Apr 2023 07:07  Patient On (Oxygen Delivery Method): room air        PHYSICAL EXAMINATION:  GENERAL: NAD, well built  HEAD:  Atraumatic, Normocephalic  EYES:  conjunctiva and sclera clear  NECK: Supple, No JVD, Normal thyroid  CHEST/LUNG: Clear to auscultation. Clear to percussion bilaterally; No rales, rhonchi, wheezing, or rubs  HEART: Regular rate and rhythm; No murmurs, rubs, or gallops  ABDOMEN: Soft, Nontender, Nondistended; Bowel sounds present  NERVOUS SYSTEM:  Alert & Oriented X3,    EXTREMITIES:  2+ Peripheral Pulses, No clubbing, cyanosis, or edema  SKIN: warm dry                          9.7    8.72  )-----------( 324      ( 16 Apr 2023 06:34 )             31.0     04-16    137  |  101  |  22<H>  ----------------------------<  181<H>  3.5   |  28  |  0.93    Ca    9.4      16 Apr 2023 06:34    TPro  7.8  /  Alb  2.9<L>  /  TBili  0.4  /  DBili  x   /  AST  27  /  ALT  27  /  AlkPhos  90  04-16    LIVER FUNCTIONS - ( 16 Apr 2023 06:34 )  Alb: 2.9 g/dL / Pro: 7.8 g/dL / ALK PHOS: 90 U/L / ALT: 27 U/L DA / AST: 27 U/L / GGT: x                   CAPILLARY BLOOD GLUCOSE      RADIOLOGY & ADDITIONAL TESTS:                   PGY-1 Progress Note discussed with attending    INTERVAL HPI/OVERNIGHT EVENTS:   No acute overnight events. Patient complains of mild shortness of breath. No other complains were reported at this time.     MEDICATIONS  (STANDING):  allopurinol 100 milliGRAM(s) Oral daily  aspirin  chewable 81 milliGRAM(s) Oral daily  carvedilol 6.25 milliGRAM(s) Oral every 12 hours  enoxaparin Injectable 40 milliGRAM(s) SubCutaneous every 24 hours  furosemide   Injectable 20 milliGRAM(s) IV Push daily  insulin glargine Injectable (LANTUS) 10 Unit(s) SubCutaneous at bedtime  insulin lispro (ADMELOG) corrective regimen sliding scale   SubCutaneous Before meals and at bedtime  insulin lispro Injectable (ADMELOG) 6 Unit(s) SubCutaneous three times a day before meals  lisinopril 2.5 milliGRAM(s) Oral daily    MEDICATIONS  (PRN):  acetaminophen     Tablet .. 650 milliGRAM(s) Oral every 6 hours PRN Temp greater or equal to 38C (100.4F), Mild Pain (1 - 3)  aluminum hydroxide/magnesium hydroxide/simethicone Suspension 30 milliLiter(s) Oral every 4 hours PRN Dyspepsia  melatonin 3 milliGRAM(s) Oral at bedtime PRN Insomnia  ondansetron Injectable 4 milliGRAM(s) IV Push every 8 hours PRN Nausea and/or Vomiting      REVIEW OF SYSTEMS:  CONSTITUTIONAL: No fever, weight loss, or fatigue  RESPIRATORY: No cough, wheezing, chills or hemoptysis; +shortness of breath  CARDIOVASCULAR: No chest pain, palpitations, dizziness, or leg swelling  GASTROINTESTINAL: No abdominal pain. No nausea, vomiting, or hematemesis; No diarrhea or constipation. No melena or hematochezia.  GENITOURINARY: No dysuria or hematuria, urinary frequency  NEUROLOGICAL: No headaches, memory loss, loss of strength, numbness, or tremors  SKIN: No itching, burning, rashes, or lesions     Vital Signs Last 24 Hrs  T(C): 36.8 (17 Apr 2023 07:07), Max: 36.9 (16 Apr 2023 23:52)  T(F): 98.2 (17 Apr 2023 07:07), Max: 98.4 (16 Apr 2023 23:52)  HR: 70 (17 Apr 2023 07:07) (69 - 72)  BP: 121/56 (17 Apr 2023 07:07) (121/56 - 146/80)  BP(mean): --  RR: 19 (17 Apr 2023 07:07) (18 - 19)  SpO2: 94% (17 Apr 2023 07:07) (94% - 100%)    Parameters below as of 17 Apr 2023 07:07  Patient On (Oxygen Delivery Method): room air        PHYSICAL EXAMINATION:  General: WDWN  HEENT: NC/AT; PERRL, anicteric sclera; MMM  Neck: supple, +supraclavicular and cervical fat pads  Cardiovascular: 4/5 systolic murmur heard best at LUSB, +S1/S2; RRR  Respiratory: fine rales noted at right base, CTA on left; no Wheezes or Rhonchi  Gastrointestinal: soft, NT/ distended; +BSx4  Extremities: WWP; no edema, clubbing or cyanosis  Vascular: 2+ radial, DP/PT pulses B/L  Skin: Warm, dry, good turgor, no rashes, +ecchymosis L forearm, some axillary and abdominal striae  Neurological: AAOx3; no focal deficits                          9.7    8.72  )-----------( 324      ( 16 Apr 2023 06:34 )             31.0     04-16    137  |  101  |  22<H>  ----------------------------<  181<H>  3.5   |  28  |  0.93    Ca    9.4      16 Apr 2023 06:34    TPro  7.8  /  Alb  2.9<L>  /  TBili  0.4  /  DBili  x   /  AST  27  /  ALT  27  /  AlkPhos  90  04-16    LIVER FUNCTIONS - ( 16 Apr 2023 06:34 )  Alb: 2.9 g/dL / Pro: 7.8 g/dL / ALK PHOS: 90 U/L / ALT: 27 U/L DA / AST: 27 U/L / GGT: x                   CAPILLARY BLOOD GLUCOSE      RADIOLOGY & ADDITIONAL TESTS:

## 2023-04-17 NOTE — PROGRESS NOTE ADULT - SUBJECTIVE AND OBJECTIVE BOX
CHIEF COMPLAINT:Patient is a 67y old  Female who presents with a chief complaint of AHRF secondary to acute cardiogenic pulmonary edema, with valvular involvement. Pt appears comfortable.    	  REVIEW OF SYSTEMS:  CONSTITUTIONAL: No fever, weight loss, or fatigue  EYES: No eye pain, visual disturbances, or discharge  ENT:  No difficulty hearing, tinnitus, vertigo; No sinus or throat pain  NECK: No pain or stiffness  RESPIRATORY: No cough, wheezing, chills or hemoptysis; No Shortness of Breath  CARDIOVASCULAR: No chest pain, palpitations, passing out, dizziness, or leg swelling  GASTROINTESTINAL: No abdominal or epigastric pain. No nausea, vomiting, or hematemesis; No diarrhea or constipation. No melena or hematochezia.  GENITOURINARY: No dysuria, frequency, hematuria, or incontinence  NEUROLOGICAL: No headaches, memory loss, loss of strength, numbness, or tremors  SKIN: No itching, burning, rashes, or lesions   LYMPH Nodes: No enlarged glands  ENDOCRINE: No heat or cold intolerance; No hair loss  MUSCULOSKELETAL: No joint pain or swelling; No muscle, back, or extremity pain  PSYCHIATRIC: No depression, anxiety, mood swings, or difficulty sleeping  HEME/LYMPH: No easy bruising, or bleeding gums  ALLERGY AND IMMUNOLOGIC: No hives or eczema	    PHYSICAL EXAM:  T(C): 36.8 (04-17-23 @ 07:07), Max: 36.9 (04-16-23 @ 23:52)  HR: 70 (04-17-23 @ 07:07) (67 - 72)  BP: 121/56 (04-17-23 @ 07:07) (121/56 - 146/80)  RR: 19 (04-17-23 @ 07:07) (18 - 19)  SpO2: 94% (04-17-23 @ 07:07) (94% - 100%)  Wt(kg): --  I&O's Summary    16 Apr 2023 07:01  -  17 Apr 2023 07:00  --------------------------------------------------------  IN: 818 mL / OUT: 2600 mL / NET: -1782 mL        Appearance: Normal	  HEENT:   Normal oral mucosa, PERRL, EOMI	  Lymphatic: No lymphadenopathy  Cardiovascular: Normal S1 S2, 2/6sm  Respiratory: Lungs clear to auscultation	  Psychiatry: A & O x 3, Mood & affect appropriate  Gastrointestinal:  Soft, Non-tender, + BS	  Skin: No rashes, No ecchymoses, No cyanosis	  Neurologic: Non-focal  Extremities: Normal range of motion, No clubbing, cyanosis or edema  Vascular: Peripheral pulses palpable 2+ bilaterally    MEDICATIONS  (STANDING):  allopurinol 100 milliGRAM(s) Oral daily  aspirin  chewable 81 milliGRAM(s) Oral daily  carvedilol 6.25 milliGRAM(s) Oral every 12 hours  enoxaparin Injectable 40 milliGRAM(s) SubCutaneous every 24 hours  furosemide   Injectable 20 milliGRAM(s) IV Push daily  insulin glargine Injectable (LANTUS) 10 Unit(s) SubCutaneous at bedtime  insulin lispro (ADMELOG) corrective regimen sliding scale   SubCutaneous Before meals and at bedtime  insulin lispro Injectable (ADMELOG) 6 Unit(s) SubCutaneous three times a day before meals  lisinopril 2.5 milliGRAM(s) Oral daily        LABS:	 	                          9.7    8.72  )-----------( 324      ( 16 Apr 2023 06:34 )             31.0     04-16    137  |  101  |  22<H>  ----------------------------<  181<H>  3.5   |  28  |  0.93    Ca    9.4      16 Apr 2023 06:34    TPro  7.8  /  Alb  2.9<L>  /  TBili  0.4  /  DBili  x   /  AST  27  /  ALT  27  /  AlkPhos  90  04-16      Lipid Profile: Cholesterol 140  LDL --  HDL 44  TG 88  Ldl calc 78  Ratio --    HgA1c:   TSH: Thyroid Stimulating Hormone, Serum: 1.96 uU/mL (04-15 @ 06:01)

## 2023-04-17 NOTE — PROGRESS NOTE ADULT - PROBLEM SELECTOR PLAN 3
- P/w WBC 11.3 with hypotension 85/70  - Afebrile  - Likely reactive to flash pulmonary edema  - Lactate 4.0 (likely due to flash pulmonary edema cause hypoxia) -> downtrending 3.5->2.8  - BCx: NGTD  - WBC WNL - Resolved.   - P/w WBC 11.3 with hypotension 85/70  - Afebrile  - Likely reactive to flash pulmonary edema  - Lactate 4.0 (likely due to flash pulmonary edema cause hypoxia) -> downtrending 3.5->2.8  - BCx: NGTD  - WBC WNL

## 2023-04-17 NOTE — PROGRESS NOTE ADULT - PROBLEM SELECTOR PLAN 1
- 65% on room air -> NRB -> now comfortable on room air  - Likely due to flash cardiogenic pulmonary edema from valvular dysfunction  - CTA chest = No PE. Likely pulmonary edema with small bilateral pleural effusions.  - Management of pulmonary edema and valvular issue below  -c/w Lasix 20mg IV qd - 65% on room air -> NRB -> now comfortable on room air  - Likely due to flash cardiogenic pulmonary edema from valvular dysfunction/G2DD.  - CTA chest = No PE. Likely pulmonary edema with small bilateral pleural effusions.  - Management of pulmonary edema and valvular issue below  - c/w Lasix 20mg IV qd

## 2023-04-17 NOTE — PROGRESS NOTE ADULT - PROBLEM SELECTOR PLAN 2
- continue with diureses  - switched lasix 20 mg IV from BID to QD  - Cardiology consulted: Isabel  - TTE prelim normal EF, mild AS and MS  - Plan for stress test on 4/17 as AS and MS would not explain flash pulm edema - continue with diureses  - switched lasix 20 mg IV from BID to QD  - TTE - Trace MR and MS with AS. G2DD.   - Stress test - negative.   - F/U NHUNG tomorrow.  - Cardiology consulted: Isabel

## 2023-04-17 NOTE — PROGRESS NOTE ADULT - PROBLEM SELECTOR PLAN 4
- Home meds Amlodipine 5mg, Metoprolol Succinate 25mg ER, Enalapril 20mg daily  - Cardio Dr. Hall = Coreg 6.25 mg BID, Lisinopril 2.5 daily  - r/o cushings: central obesity w/ supraclavicular and cervical fat pad, thinning hair, adrenal mass seen on CT  - f/u random urine cortisol  - f/u AM cortisol - Home meds Amlodipine 5mg, Metoprolol Succinate 25mg ER, Enalapril 20mg daily  - Patient started on Coreg 6.25 mg BID, Lisinopril 2.5 daily on cardio recommendations.   - r/o cushings: central obesity w/ supraclavicular and cervical fat pad, thinning hair, adrenal mass seen on CT  - f/u random urine cortisol  - f/u AM cortisol  - Dr Hall.

## 2023-04-17 NOTE — PROGRESS NOTE ADULT - ATTENDING COMMENTS
Patient seen and examined at bedside. No acute events overnight. Improved volume status with nearly net neg of 1 liter. Patient mainly concerned about this recurring as she has had flash pulmonary edema twice in past two months. TTE still pending read. Discussed with cardiology with plans for NST tomorrow.
Patient seen and examined at bedside. No acute events overnight. Patient feeling well without complaints. Appears euvolemic--hope to switch to oral diuretics soon. NST unremarkable. TTE with only moderate MS/AS. Cardiology plans for NHUNG. Etiology of flash pulmonary edema is not clear at this time.

## 2023-04-18 ENCOUNTER — TRANSCRIPTION ENCOUNTER (OUTPATIENT)
Age: 68
End: 2023-04-18

## 2023-04-18 VITALS
SYSTOLIC BLOOD PRESSURE: 136 MMHG | TEMPERATURE: 98 F | DIASTOLIC BLOOD PRESSURE: 70 MMHG | OXYGEN SATURATION: 100 % | RESPIRATION RATE: 18 BRPM | HEART RATE: 71 BPM

## 2023-04-18 DIAGNOSIS — I50.33 ACUTE ON CHRONIC DIASTOLIC (CONGESTIVE) HEART FAILURE: ICD-10-CM

## 2023-04-18 LAB
ALBUMIN SERPL ELPH-MCNC: 3 G/DL — LOW (ref 3.5–5)
ALP SERPL-CCNC: 100 U/L — SIGNIFICANT CHANGE UP (ref 40–120)
ALT FLD-CCNC: 33 U/L DA — SIGNIFICANT CHANGE UP (ref 10–60)
ANION GAP SERPL CALC-SCNC: 7 MMOL/L — SIGNIFICANT CHANGE UP (ref 5–17)
APTT BLD: 26 SEC — LOW (ref 27.5–35.5)
AST SERPL-CCNC: 26 U/L — SIGNIFICANT CHANGE UP (ref 10–40)
BILIRUB SERPL-MCNC: 0.3 MG/DL — SIGNIFICANT CHANGE UP (ref 0.2–1.2)
BLD GP AB SCN SERPL QL: SIGNIFICANT CHANGE UP
BUN SERPL-MCNC: 24 MG/DL — HIGH (ref 7–18)
CALCIUM SERPL-MCNC: 9.1 MG/DL — SIGNIFICANT CHANGE UP (ref 8.4–10.5)
CHLORIDE SERPL-SCNC: 103 MMOL/L — SIGNIFICANT CHANGE UP (ref 96–108)
CO2 SERPL-SCNC: 28 MMOL/L — SIGNIFICANT CHANGE UP (ref 22–31)
CORTIS AM PEAK SERPL-MCNC: 8.7 UG/DL — SIGNIFICANT CHANGE UP (ref 6–18.4)
CREAT SERPL-MCNC: 0.97 MG/DL — SIGNIFICANT CHANGE UP (ref 0.5–1.3)
EGFR: 64 ML/MIN/1.73M2 — SIGNIFICANT CHANGE UP
GLUCOSE BLDC GLUCOMTR-MCNC: 196 MG/DL — HIGH (ref 70–99)
GLUCOSE BLDC GLUCOMTR-MCNC: 202 MG/DL — HIGH (ref 70–99)
GLUCOSE BLDC GLUCOMTR-MCNC: 212 MG/DL — HIGH (ref 70–99)
GLUCOSE BLDC GLUCOMTR-MCNC: 249 MG/DL — HIGH (ref 70–99)
GLUCOSE SERPL-MCNC: 213 MG/DL — HIGH (ref 70–99)
HCT VFR BLD CALC: 32.7 % — LOW (ref 34.5–45)
HGB BLD-MCNC: 10.2 G/DL — LOW (ref 11.5–15.5)
INR BLD: 1.07 RATIO — SIGNIFICANT CHANGE UP (ref 0.88–1.16)
MAGNESIUM SERPL-MCNC: 2 MG/DL — SIGNIFICANT CHANGE UP (ref 1.6–2.6)
MCHC RBC-ENTMCNC: 23.4 PG — LOW (ref 27–34)
MCHC RBC-ENTMCNC: 31.2 GM/DL — LOW (ref 32–36)
MCV RBC AUTO: 75 FL — LOW (ref 80–100)
NRBC # BLD: 0 /100 WBCS — SIGNIFICANT CHANGE UP (ref 0–0)
PHOSPHATE SERPL-MCNC: 3.5 MG/DL — SIGNIFICANT CHANGE UP (ref 2.5–4.5)
PLATELET # BLD AUTO: 343 K/UL — SIGNIFICANT CHANGE UP (ref 150–400)
POTASSIUM SERPL-MCNC: 4.2 MMOL/L — SIGNIFICANT CHANGE UP (ref 3.5–5.3)
POTASSIUM SERPL-SCNC: 4.2 MMOL/L — SIGNIFICANT CHANGE UP (ref 3.5–5.3)
PROT SERPL-MCNC: 8 G/DL — SIGNIFICANT CHANGE UP (ref 6–8.3)
PROTHROM AB SERPL-ACNC: 12.7 SEC — SIGNIFICANT CHANGE UP (ref 10.5–13.4)
RBC # BLD: 4.36 M/UL — SIGNIFICANT CHANGE UP (ref 3.8–5.2)
RBC # FLD: 16.8 % — HIGH (ref 10.3–14.5)
SARS-COV-2 RNA SPEC QL NAA+PROBE: SIGNIFICANT CHANGE UP
SODIUM SERPL-SCNC: 138 MMOL/L — SIGNIFICANT CHANGE UP (ref 135–145)
WBC # BLD: 8.38 K/UL — SIGNIFICANT CHANGE UP (ref 3.8–10.5)
WBC # FLD AUTO: 8.38 K/UL — SIGNIFICANT CHANGE UP (ref 3.8–10.5)

## 2023-04-18 PROCEDURE — 93312 ECHO TRANSESOPHAGEAL: CPT

## 2023-04-18 PROCEDURE — 87635 SARS-COV-2 COVID-19 AMP PRB: CPT

## 2023-04-18 PROCEDURE — 80061 LIPID PANEL: CPT

## 2023-04-18 PROCEDURE — 86803 HEPATITIS C AB TEST: CPT

## 2023-04-18 PROCEDURE — 83540 ASSAY OF IRON: CPT

## 2023-04-18 PROCEDURE — 71045 X-RAY EXAM CHEST 1 VIEW: CPT

## 2023-04-18 PROCEDURE — 86901 BLOOD TYPING SEROLOGIC RH(D): CPT

## 2023-04-18 PROCEDURE — 86850 RBC ANTIBODY SCREEN: CPT

## 2023-04-18 PROCEDURE — 83880 ASSAY OF NATRIURETIC PEPTIDE: CPT

## 2023-04-18 PROCEDURE — 83735 ASSAY OF MAGNESIUM: CPT

## 2023-04-18 PROCEDURE — 93306 TTE W/DOPPLER COMPLETE: CPT

## 2023-04-18 PROCEDURE — 84484 ASSAY OF TROPONIN QUANT: CPT

## 2023-04-18 PROCEDURE — 85610 PROTHROMBIN TIME: CPT

## 2023-04-18 PROCEDURE — 84443 ASSAY THYROID STIM HORMONE: CPT

## 2023-04-18 PROCEDURE — 93005 ELECTROCARDIOGRAM TRACING: CPT

## 2023-04-18 PROCEDURE — 78452 HT MUSCLE IMAGE SPECT MULT: CPT

## 2023-04-18 PROCEDURE — 86900 BLOOD TYPING SEROLOGIC ABO: CPT

## 2023-04-18 PROCEDURE — 85025 COMPLETE CBC W/AUTO DIFF WBC: CPT

## 2023-04-18 PROCEDURE — 81001 URINALYSIS AUTO W/SCOPE: CPT

## 2023-04-18 PROCEDURE — 82272 OCCULT BLD FECES 1-3 TESTS: CPT

## 2023-04-18 PROCEDURE — A9502: CPT

## 2023-04-18 PROCEDURE — 99285 EMERGENCY DEPT VISIT HI MDM: CPT

## 2023-04-18 PROCEDURE — 87040 BLOOD CULTURE FOR BACTERIA: CPT

## 2023-04-18 PROCEDURE — 93325 DOPPLER ECHO COLOR FLOW MAPG: CPT

## 2023-04-18 PROCEDURE — 82530 CORTISOL FREE: CPT

## 2023-04-18 PROCEDURE — 83605 ASSAY OF LACTIC ACID: CPT

## 2023-04-18 PROCEDURE — 84100 ASSAY OF PHOSPHORUS: CPT

## 2023-04-18 PROCEDURE — 82728 ASSAY OF FERRITIN: CPT

## 2023-04-18 PROCEDURE — 82803 BLOOD GASES ANY COMBINATION: CPT

## 2023-04-18 PROCEDURE — 85027 COMPLETE CBC AUTOMATED: CPT

## 2023-04-18 PROCEDURE — 80053 COMPREHEN METABOLIC PANEL: CPT

## 2023-04-18 PROCEDURE — 81003 URINALYSIS AUTO W/O SCOPE: CPT

## 2023-04-18 PROCEDURE — 93320 DOPPLER ECHO COMPLETE: CPT

## 2023-04-18 PROCEDURE — 82533 TOTAL CORTISOL: CPT

## 2023-04-18 PROCEDURE — 99239 HOSP IP/OBS DSCHRG MGMT >30: CPT | Mod: GC

## 2023-04-18 PROCEDURE — 83550 IRON BINDING TEST: CPT

## 2023-04-18 PROCEDURE — 87637 SARSCOV2&INF A&B&RSV AMP PRB: CPT

## 2023-04-18 PROCEDURE — 93017 CV STRESS TEST TRACING ONLY: CPT

## 2023-04-18 PROCEDURE — 36415 COLL VENOUS BLD VENIPUNCTURE: CPT

## 2023-04-18 PROCEDURE — 82962 GLUCOSE BLOOD TEST: CPT

## 2023-04-18 PROCEDURE — 71275 CT ANGIOGRAPHY CHEST: CPT | Mod: MA

## 2023-04-18 PROCEDURE — 85730 THROMBOPLASTIN TIME PARTIAL: CPT

## 2023-04-18 RX ORDER — CARVEDILOL PHOSPHATE 80 MG/1
1 CAPSULE, EXTENDED RELEASE ORAL
Qty: 60 | Refills: 0
Start: 2023-04-18 | End: 2023-05-17

## 2023-04-18 RX ORDER — FUROSEMIDE 40 MG
40 TABLET ORAL DAILY
Refills: 0 | Status: DISCONTINUED | OUTPATIENT
Start: 2023-04-18 | End: 2023-04-18

## 2023-04-18 RX ORDER — FUROSEMIDE 40 MG
1 TABLET ORAL
Qty: 30 | Refills: 0
Start: 2023-04-18 | End: 2023-05-17

## 2023-04-18 RX ORDER — METOPROLOL TARTRATE 50 MG
1 TABLET ORAL
Refills: 0 | DISCHARGE

## 2023-04-18 RX ORDER — AMLODIPINE BESYLATE 2.5 MG/1
1 TABLET ORAL
Refills: 0 | DISCHARGE

## 2023-04-18 RX ORDER — LISINOPRIL 2.5 MG/1
1 TABLET ORAL
Qty: 30 | Refills: 0
Start: 2023-04-18 | End: 2023-05-17

## 2023-04-18 RX ADMIN — Medication 2: at 16:51

## 2023-04-18 RX ADMIN — ENOXAPARIN SODIUM 40 MILLIGRAM(S): 100 INJECTION SUBCUTANEOUS at 17:04

## 2023-04-18 RX ADMIN — CARVEDILOL PHOSPHATE 6.25 MILLIGRAM(S): 80 CAPSULE, EXTENDED RELEASE ORAL at 17:53

## 2023-04-18 RX ADMIN — Medication 100 MILLIGRAM(S): at 12:19

## 2023-04-18 RX ADMIN — Medication 2: at 12:17

## 2023-04-18 RX ADMIN — Medication 81 MILLIGRAM(S): at 12:19

## 2023-04-18 RX ADMIN — Medication 6 UNIT(S): at 16:51

## 2023-04-18 RX ADMIN — Medication 6 UNIT(S): at 12:15

## 2023-04-18 NOTE — PROGRESS NOTE ADULT - REASON FOR ADMISSION
AHRF secondary to acute cardiogenic pulmonary edema, with valvular involvement.

## 2023-04-18 NOTE — DISCHARGE NOTE NURSING/CASE MANAGEMENT/SOCIAL WORK - PATIENT PORTAL LINK FT
You can access the FollowMyHealth Patient Portal offered by [facility] by registering at the following website: [email or web address]. By joining [facility]’s FollowMyHealth portal, you will also be able to view your health information using other applications (apps) compatible with our system.

## 2023-04-18 NOTE — PROGRESS NOTE ADULT - PROBLEM SELECTOR PLAN 7
- C/w Allopurinol 100mg daily - Home med Humalog 18U BID, Metformin 500 BID, Ozempic 0.25 weekly  - Adding Admelog 6U TID premeals + 10U Lantus  - ISS + FS achs  - Titrate up as needed

## 2023-04-18 NOTE — DISCHARGE NOTE PROVIDER - REASON FOR ADMISSION
AHRF secondary to acute cardiogenic pulmonary edema AHRF secondary to acute cardiogenic pulmonary edema, with valvular involvement.

## 2023-04-18 NOTE — PROGRESS NOTE ADULT - PROBLEM SELECTOR PLAN 2
- continue with diureses  - switched lasix 20 mg IV from BID to QD  - TTE - Trace MR and MS with AS. G2DD.   - Stress test - negative.   - F/U NHUNG tomorrow.  - Cardiology consulted: Isabel - continue with diureses.  - switched to lasix 40mg oral.   - TTE - Trace MR and MS with AS. G2DD.   - Stress test - negative.   - NHUNG - Trace MR and MS with AS. G2DD.   - Patient stable for discharge.   - Cardiology consulted: Isabel

## 2023-04-18 NOTE — PROGRESS NOTE ADULT - SUBJECTIVE AND OBJECTIVE BOX
CHIEF COMPLAINT:Patient is a 67y old  Female who presents with a chief complaint of AHRF secondary to acute cardiogenic pulmonary edema, with valvular involvement. Pt appears comfortable.    	  REVIEW OF SYSTEMS:  CONSTITUTIONAL: No fever, weight loss, or fatigue  EYES: No eye pain, visual disturbances, or discharge  ENT:  No difficulty hearing, tinnitus, vertigo; No sinus or throat pain  NECK: No pain or stiffness  RESPIRATORY: No cough, wheezing, chills or hemoptysis; No Shortness of Breath  CARDIOVASCULAR: No chest pain, palpitations, passing out, dizziness, or leg swelling  GASTROINTESTINAL: No abdominal or epigastric pain. No nausea, vomiting, or hematemesis; No diarrhea or constipation. No melena or hematochezia.  GENITOURINARY: No dysuria, frequency, hematuria, or incontinence  NEUROLOGICAL: No headaches, memory loss, loss of strength, numbness, or tremors  SKIN: No itching, burning, rashes, or lesions   LYMPH Nodes: No enlarged glands  ENDOCRINE: No heat or cold intolerance; No hair loss  MUSCULOSKELETAL: No joint pain or swelling; No muscle, back, or extremity pain  PSYCHIATRIC: No depression, anxiety, mood swings, or difficulty sleeping  HEME/LYMPH: No easy bruising, or bleeding gums  ALLERGY AND IMMUNOLOGIC: No hives or eczema	        PHYSICAL EXAM:  T(C): 36.6 (04-18-23 @ 07:08), Max: 36.9 (04-17-23 @ 23:05)  HR: 72 (04-18-23 @ 07:08) (69 - 80)  BP: 123/52 (04-18-23 @ 07:08) (101/51 - 140/71)  RR: 19 (04-18-23 @ 07:08) (18 - 19)  SpO2: 99% (04-18-23 @ 07:08) (97% - 100%)  Wt(kg): --  I&O's Summary    17 Apr 2023 07:01  -  18 Apr 2023 07:00  --------------------------------------------------------  IN: 290 mL / OUT: 1900 mL / NET: -1610 mL    18 Apr 2023 07:01  -  18 Apr 2023 08:53  --------------------------------------------------------  IN: 0 mL / OUT: 800 mL / NET: -800 mL        Appearance: Normal	  HEENT:   Normal oral mucosa, PERRL, EOMI	  Lymphatic: No lymphadenopathy  Cardiovascular: Normal S1 S2, No JVD, No murmurs, No edema  Respiratory: Lungs clear to auscultation	  Psychiatry: A & O x 3, Mood & affect appropriate  Gastrointestinal:  Soft, Non-tender, + BS	  Skin: No rashes, No ecchymoses, No cyanosis	  Neurologic: Non-focal  Extremities: Normal range of motion, No clubbing, cyanosis or edema  Vascular: Peripheral pulses palpable 2+ bilaterally    MEDICATIONS  (STANDING):  allopurinol 100 milliGRAM(s) Oral daily  aspirin  chewable 81 milliGRAM(s) Oral daily  carvedilol 6.25 milliGRAM(s) Oral every 12 hours  enoxaparin Injectable 40 milliGRAM(s) SubCutaneous every 24 hours  furosemide    Tablet 40 milliGRAM(s) Oral daily  insulin glargine Injectable (LANTUS) 10 Unit(s) SubCutaneous at bedtime  insulin lispro (ADMELOG) corrective regimen sliding scale   SubCutaneous Before meals and at bedtime  insulin lispro Injectable (ADMELOG) 6 Unit(s) SubCutaneous three times a day before meals  lisinopril 2.5 milliGRAM(s) Oral daily      TELEMETRY: 	 nsr     LABS:	 	                        10.2   8.38  )-----------( 343      ( 18 Apr 2023 06:21 )             32.7     04-18    138  |  103  |  24<H>  ----------------------------<  213<H>  4.2   |  28  |  0.97    Ca    9.1      18 Apr 2023 06:21  Phos  3.5     04-18  Mg     2.0     04-18    TPro  8.0  /  Alb  3.0<L>  /  TBili  0.3  /  DBili  x   /  AST  26  /  ALT  33  /  AlkPhos  100  04-18      Lipid Profile: Cholesterol 140  LDL --  HDL 44  TG 88  Ldl calc 78  Ratio --    HgA1c:   TSH: Thyroid Stimulating Hormone, Serum: 1.96 uU/mL (04-15 @ 06:01)      	    < from: Nuclear Stress Test-Pharmacologic (04.17.23 @ 07:33) >  IMPRESSIONS:Normal Study  * Negative ECG evidence of ischemia after IV of Lexiscan.  * Review of raw data shows: The study is of good technical  quality.  * The left ventricle was normal in size. Normal myocardial  perfusion scan, with no evidence of infarction or  inducible ischemia.  * Gated wall motion analysis is performed, and shows  normal wall motion with post stress LVEF of 68%.    ------------------------------------------------------------------------      ------------------------------------------------------------------------    Confirmed on  4/17/2023 - 12:59:36 at [place] by  Ning Hall MD    < end of copied text >

## 2023-04-18 NOTE — DISCHARGE NOTE NURSING/CASE MANAGEMENT/SOCIAL WORK - NSSCNAMETXT_GEN_ALL_CORE
[facility] (formerly [facility])  Phone:

## 2023-04-18 NOTE — PROGRESS NOTE ADULT - PROBLEM SELECTOR PLAN 1
- 65% on room air -> NRB -> now comfortable on room air  - Likely due to flash cardiogenic pulmonary edema from valvular dysfunction/G2DD.  - CTA chest = No PE. Likely pulmonary edema with small bilateral pleural effusions.  - Management of pulmonary edema and valvular issue below  - c/w Lasix 20mg IV qd - 65% on room air -> NRB -> now comfortable on room air  - Likely due to flash cardiogenic pulmonary edema from valvular dysfunction/G2DD.  - CTA chest = No PE. Likely pulmonary edema with small bilateral pleural effusions.  - Management of pulmonary edema and valvular issue below  - Switch lasix to 40mg oral.

## 2023-04-18 NOTE — PROGRESS NOTE ADULT - PROBLEM SELECTOR PLAN 5
p/w Hbg of 10.6 w/ low MCV  -Today Hbg 9.7   -Total Iron 27, %sat 7  -No signs of bleeding  -monitor CBC  -Consider PO iron supplement (patient worried about constipation) - Home meds Amlodipine 5mg, Metoprolol Succinate 25mg ER, Enalapril 20mg daily  - Patient started on Coreg 6.25 mg BID, Lisinopril 2.5 daily on cardio recommendations.   - Dr Hall.

## 2023-04-18 NOTE — DISCHARGE NOTE PROVIDER - CARE PROVIDER_API CALL
Ning Hall)  Internal Medicine  [address]  [place] [id number]  Phone: [phone]  Fax: [phone]  Follow Up Time:     Rylan Posada  INTERNAL MEDICINE  [address] [place] [address]  Phone: [phone]  Fax: [phone]  Follow Up Time:     Elizabeth Arechiga  CARDIOVASCULAR DISEASE  [address] [place] [address]  Phone: [phone]  Fax: [phone]  Follow Up Time:

## 2023-04-18 NOTE — PROGRESS NOTE ADULT - PROBLEM SELECTOR PLAN 6
- Home med Humalog 18U BID, Metformin 500 BID, Ozempic 0.25 weekly  - Adding Admelog 6U TID premeals + 10U Lantus  - ISS + FS achs  - Titrate up as needed p/w Hbg of 10.6 w/ low MCV  -Today Hbg 9.7   -Total Iron 27, %sat 7  -No signs of bleeding  -monitor CBC  -Consider PO iron supplement (patient worried about constipation)

## 2023-04-18 NOTE — DISCHARGE NOTE PROVIDER - PROVIDER TOKENS
PROVIDER:[TOKEN:[[id number]]],PROVIDER:[TOKEN:[[id number]]],PROVIDER:[TOKEN:[[id number]]]

## 2023-04-18 NOTE — PROGRESS NOTE ADULT - PROBLEM SELECTOR PLAN 4
- Home meds Amlodipine 5mg, Metoprolol Succinate 25mg ER, Enalapril 20mg daily  - Patient started on Coreg 6.25 mg BID, Lisinopril 2.5 daily on cardio recommendations.   - r/o cushings: central obesity w/ supraclavicular and cervical fat pad, thinning hair, adrenal mass seen on CT  - f/u random urine cortisol  - f/u AM cortisol  - Dr Hall. - Resolved.   - P/w WBC 11.3 with hypotension 85/70  - Afebrile  - Likely reactive to flash pulmonary edema  - Lactate 4.0 (likely due to flash pulmonary edema cause hypoxia) -> downtrending 3.5->2.8  - BCx: NGTD  - WBC WNL

## 2023-04-18 NOTE — PROGRESS NOTE ADULT - SUBJECTIVE AND OBJECTIVE BOX
PGY-1 Progress Note discussed with attending    INTERVAL HPI/OVERNIGHT EVENTS:   MEDICATIONS  (STANDING):  allopurinol 100 milliGRAM(s) Oral daily  aspirin  chewable 81 milliGRAM(s) Oral daily  carvedilol 6.25 milliGRAM(s) Oral every 12 hours  enoxaparin Injectable 40 milliGRAM(s) SubCutaneous every 24 hours  furosemide    Tablet 40 milliGRAM(s) Oral daily  insulin glargine Injectable (LANTUS) 10 Unit(s) SubCutaneous at bedtime  insulin lispro (ADMELOG) corrective regimen sliding scale   SubCutaneous Before meals and at bedtime  insulin lispro Injectable (ADMELOG) 6 Unit(s) SubCutaneous three times a day before meals  lisinopril 2.5 milliGRAM(s) Oral daily    MEDICATIONS  (PRN):  acetaminophen     Tablet .. 650 milliGRAM(s) Oral every 6 hours PRN Temp greater or equal to 38C (100.4F), Mild Pain (1 - 3)  aluminum hydroxide/magnesium hydroxide/simethicone Suspension 30 milliLiter(s) Oral every 4 hours PRN Dyspepsia  melatonin 3 milliGRAM(s) Oral at bedtime PRN Insomnia  ondansetron Injectable 4 milliGRAM(s) IV Push every 8 hours PRN Nausea and/or Vomiting      REVIEW OF SYSTEMS:  CONSTITUTIONAL: No fever, weight loss, or fatigue  RESPIRATORY: No cough, wheezing, chills or hemoptysis; No shortness of breath  CARDIOVASCULAR: No chest pain, palpitations, dizziness, or leg swelling  GASTROINTESTINAL: No abdominal pain. No nausea, vomiting, or hematemesis; No diarrhea or constipation. No melena or hematochezia.  GENITOURINARY: No dysuria or hematuria, urinary frequency  NEUROLOGICAL: No headaches, memory loss, loss of strength, numbness, or tremors  SKIN: No itching, burning, rashes, or lesions     Vital Signs Last 24 Hrs  T(C): 36.6 (18 Apr 2023 07:08), Max: 36.9 (17 Apr 2023 23:05)  T(F): 97.9 (18 Apr 2023 07:08), Max: 98.4 (17 Apr 2023 23:05)  HR: 72 (18 Apr 2023 07:08) (69 - 80)  BP: 123/52 (18 Apr 2023 07:08) (101/51 - 140/71)  BP(mean): --  RR: 19 (18 Apr 2023 07:08) (18 - 19)  SpO2: 99% (18 Apr 2023 07:08) (97% - 100%)    Parameters below as of 18 Apr 2023 07:08  Patient On (Oxygen Delivery Method): room air        PHYSICAL EXAMINATION:  GENERAL: NAD, well built  HEAD:  Atraumatic, Normocephalic  EYES:  conjunctiva and sclera clear  NECK: Supple, No JVD, Normal thyroid  CHEST/LUNG: Clear to auscultation. Clear to percussion bilaterally; No rales, rhonchi, wheezing, or rubs  HEART: Regular rate and rhythm; No murmurs, rubs, or gallops  ABDOMEN: Soft, Nontender, Nondistended; Bowel sounds present  NERVOUS SYSTEM:  Alert & Oriented X3,    EXTREMITIES:  2+ Peripheral Pulses, No clubbing, cyanosis, or edema  SKIN: warm dry                          10.2   8.38  )-----------( 343      ( 18 Apr 2023 06:21 )             32.7     04-18    138  |  103  |  24<H>  ----------------------------<  213<H>  4.2   |  28  |  0.97    Ca    9.1      18 Apr 2023 06:21  Phos  3.5     04-18  Mg     2.0     04-18    TPro  8.0  /  Alb  3.0<L>  /  TBili  0.3  /  DBili  x   /  AST  26  /  ALT  33  /  AlkPhos  100  04-18    LIVER FUNCTIONS - ( 18 Apr 2023 06:21 )  Alb: 3.0 g/dL / Pro: 8.0 g/dL / ALK PHOS: 100 U/L / ALT: 33 U/L DA / AST: 26 U/L / GGT: x               PT/INR - ( 18 Apr 2023 06:21 )   PT: 12.7 sec;   INR: 1.07 ratio         PTT - ( 18 Apr 2023 06:21 )  PTT:26.0 sec    CAPILLARY BLOOD GLUCOSE      RADIOLOGY & ADDITIONAL TESTS:                   PGY-1 Progress Note discussed with attending    INTERVAL HPI/OVERNIGHT EVENTS:   No acute overnight events. Patient denies any complains at this time.     MEDICATIONS  (STANDING):  allopurinol 100 milliGRAM(s) Oral daily  aspirin  chewable 81 milliGRAM(s) Oral daily  carvedilol 6.25 milliGRAM(s) Oral every 12 hours  enoxaparin Injectable 40 milliGRAM(s) SubCutaneous every 24 hours  furosemide    Tablet 40 milliGRAM(s) Oral daily  insulin glargine Injectable (LANTUS) 10 Unit(s) SubCutaneous at bedtime  insulin lispro (ADMELOG) corrective regimen sliding scale   SubCutaneous Before meals and at bedtime  insulin lispro Injectable (ADMELOG) 6 Unit(s) SubCutaneous three times a day before meals  lisinopril 2.5 milliGRAM(s) Oral daily    MEDICATIONS  (PRN):  acetaminophen     Tablet .. 650 milliGRAM(s) Oral every 6 hours PRN Temp greater or equal to 38C (100.4F), Mild Pain (1 - 3)  aluminum hydroxide/magnesium hydroxide/simethicone Suspension 30 milliLiter(s) Oral every 4 hours PRN Dyspepsia  melatonin 3 milliGRAM(s) Oral at bedtime PRN Insomnia  ondansetron Injectable 4 milliGRAM(s) IV Push every 8 hours PRN Nausea and/or Vomiting      REVIEW OF SYSTEMS:  CONSTITUTIONAL: No fever, weight loss, or fatigue  RESPIRATORY: No cough, wheezing, chills or hemoptysis; No shortness of breath  CARDIOVASCULAR: No chest pain, palpitations, dizziness, or leg swelling  GASTROINTESTINAL: No abdominal pain. No nausea, vomiting, or hematemesis; No diarrhea or constipation. No melena or hematochezia.  GENITOURINARY: No dysuria or hematuria, urinary frequency  NEUROLOGICAL: No headaches, memory loss, loss of strength, numbness, or tremors  SKIN: No itching, burning, rashes, or lesions     Vital Signs Last 24 Hrs  T(C): 36.6 (18 Apr 2023 07:08), Max: 36.9 (17 Apr 2023 23:05)  T(F): 97.9 (18 Apr 2023 07:08), Max: 98.4 (17 Apr 2023 23:05)  HR: 72 (18 Apr 2023 07:08) (69 - 80)  BP: 123/52 (18 Apr 2023 07:08) (101/51 - 140/71)  BP(mean): --  RR: 19 (18 Apr 2023 07:08) (18 - 19)  SpO2: 99% (18 Apr 2023 07:08) (97% - 100%)    Parameters below as of 18 Apr 2023 07:08  Patient On (Oxygen Delivery Method): room air        PHYSICAL EXAMINATION:  GENERAL: NAD, well built  HEAD:  Atraumatic, Normocephalic  EYES:  conjunctiva and sclera clear  NECK: Supple  CHEST/LUNG: Clear to auscultation. No rales, rhonchi, wheezing, or rubs  HEART: Regular rate and rhythm; No murmurs, rubs, or gallops  ABDOMEN: Soft, Nontender, Nondistended; Bowel sounds present  NERVOUS SYSTEM:  Alert & Oriented X3,    EXTREMITIES:  2+ Peripheral Pulses, No edema  SKIN: warm dry                          10.2   8.38  )-----------( 343      ( 18 Apr 2023 06:21 )             32.7     04-18    138  |  103  |  24<H>  ----------------------------<  213<H>  4.2   |  28  |  0.97    Ca    9.1      18 Apr 2023 06:21  Phos  3.5     04-18  Mg     2.0     04-18    TPro  8.0  /  Alb  3.0<L>  /  TBili  0.3  /  DBili  x   /  AST  26  /  ALT  33  /  AlkPhos  100  04-18    LIVER FUNCTIONS - ( 18 Apr 2023 06:21 )  Alb: 3.0 g/dL / Pro: 8.0 g/dL / ALK PHOS: 100 U/L / ALT: 33 U/L DA / AST: 26 U/L / GGT: x               PT/INR - ( 18 Apr 2023 06:21 )   PT: 12.7 sec;   INR: 1.07 ratio         PTT - ( 18 Apr 2023 06:21 )  PTT:26.0 sec    CAPILLARY BLOOD GLUCOSE      RADIOLOGY & ADDITIONAL TESTS:

## 2023-04-18 NOTE — DISCHARGE NOTE NURSING/CASE MANAGEMENT/SOCIAL WORK - NSDCPEFALRISK_GEN_ALL_CORE
For information on Fall & Injury Prevention, visit: [email or web address] OR  [email or web address] OR  https://www.cdc.gov/steadi/patient.html

## 2023-04-18 NOTE — PROGRESS NOTE ADULT - PROBLEM SELECTOR PROBLEM 1
Acute respiratory failure with hypoxia
0

## 2023-04-18 NOTE — PROGRESS NOTE ADULT - PROBLEM SELECTOR PROBLEM 2
Acute cardiogenic pulmonary edema

## 2023-04-18 NOTE — DISCHARGE NOTE PROVIDER - NSDCMRMEDTOKEN_GEN_ALL_CORE_FT
allopurinol 100 mg oral tablet: 1 tab(s) orally once a day  amLODIPine 5 mg oral tablet: 1 tab(s) orally once a day  aspirin 81 mg oral tablet: 1 tab(s) orally once a day  enalapril 20 mg oral tablet: 1 tab(s) orally once a day  HumaLOG KwikPen 100 units/mL injectable solution: 18 unit(s) injectable 2 times a day  metFORMIN 500 mg oral tablet: 1 tab(s) orally 2 times a day  metoprolol succinate 25 mg oral tablet, extended release: 1 tab(s) orally once a day  Ozempic 2 mg/1.5 mL (0.25 mg or 0.5 mg dose) subcutaneous solution: 25 milligram(s) subcutaneously once a week   allopurinol 100 mg oral tablet: 1 tab(s) orally once a day  aspirin 81 mg oral tablet: 1 tab(s) orally once a day  Coreg 6.25 mg oral tablet: 1 tab(s) orally every 12 hours  furosemide 40 mg oral tablet: 1 tab(s) orally once a day  HumaLOG KwikPen 100 units/mL injectable solution: 18 unit(s) injectable 2 times a day  lisinopril 2.5 mg oral tablet: 1 tab(s) orally once a day  metFORMIN 500 mg oral tablet: 1 tab(s) orally 2 times a day  Ozempic 2 mg/1.5 mL (0.25 mg or 0.5 mg dose) subcutaneous solution: 25 milligram(s) subcutaneously once a week

## 2023-04-18 NOTE — DISCHARGE NOTE PROVIDER - HOSPITAL COURSE
Patient is a 67 female, from home, with PMHx of DM, HTN, Gout, former smoker, who comes presented with shortness of breath. Patient was admitted for AHRF secondary to acute cardiogenic pulmonary edema, with valvular involvement.    On admission patient had shortness of breath and patient was saturating 65%  on room air. Patient was started on supplemental oxygen with non-rebreather and then it was eventually titrated down. Patient EKG on admission showed sinus tachycardia with age indeterminate septal infarct. Patient troponin were negative on admission. Patient received CTA chest to rule out pulmonary embolism. It did not show PE but showed small bilateral pleural effusions. Patient was started on IV lasix which was eventually changed to oral lasix once patient clinically improved. Patient also received transthoracic echocardiogram which showed EF 55%, G2DD, mild MS, MR and AS. Patient also received transesophageal echocardiogram which showed confirmed the above mentioned findings. Patient also underwent stress test which was negative. Patient was also started on carvedilol and lisinopril in the hospital. Patient's outpatient medication of enalapril, metoprolol and amlodipine was stopped.     Patient had significant hypotension on admission. However, patients blood pressure improved with IV fluids. Patient also had elevated lactate on admission likely due to severe hypotension which resolved.     Patient has history of iron deficiency anemia. Patient HB was stable during the course of the hospital stay. Patient is a 67 female, from home, with PMHx of DM, HTN, Gout, former smoker, who comes presented with shortness of breath. Patient was admitted for AHRF secondary to acute cardiogenic pulmonary edema, with valvular involvement.    On admission patient had shortness of breath and patient was saturating 65%  on room air. Patient was started on supplemental oxygen with non-rebreather and then it was eventually titrated down. Patient EKG on admission showed sinus tachycardia with age indeterminate septal infarct. Patient troponin were negative on admission. Patient received CTA chest to rule out pulmonary embolism. It did not show PE but showed small bilateral pleural effusions. Patient was started on IV lasix which was eventually changed to oral lasix once patient clinically improved. Patient also received transthoracic echocardiogram which showed EF 55%, G2DD, mild MS, MR and AS. Patient also received transesophageal echocardiogram which showed confirmed the above mentioned findings. Patient also underwent stress test which was negative. Patient was also started on carvedilol and lisinopril in the hospital. Patient's outpatient medication of enalapril, metoprolol and amlodipine was stopped.     Patient had significant hypotension on admission. However, patients blood pressure improved with IV fluids. Patient also had elevated lactate on admission likely due to severe hypotension which resolved.     Patient has history of iron deficiency anemia. Patient HB was stable during the course of the hospital stay.     Patient has history of diabetes mellitus. Patient was started on a insulin sliding scale in patient.     Patient has history of Gout. Patient was continued on her home dose of Allopurinol in the hospital.    Patient is stable for discharge.     This is only the summary of the hospital course. Please refer to patient chart for further details.

## 2023-04-18 NOTE — PROGRESS NOTE ADULT - ASSESSMENT
67 female, from home, with PMHx of DM, HTN, Gout, former smoker, who comes in from a rapid response in the lobby for shortness of breath,acute disatolic HF.  1.Tele monitoring.  2.Case D/W outpatient cardiologist Dr. Arechiga rec NHUNG to be done.  3.Acute diastolic HF-change  IV lasix to PO.   4.HTN-low dose ace and coreg 6.25mg bid.  5.DM-Insulin.  6.Cont asa,statin.  7.Fe def anemia- occult is neg, Iron.GI work-up as outpatient.  8.GI and DVT prophylaxis.

## 2023-04-18 NOTE — CHART NOTE - NSCHARTNOTEFT_GEN_A_CORE
LINDSAY FERRER  [id number]    After risks, benefits and alternatives of the procedure were explained, consent was signed and placed in the medical record.  Procedural timeout was taken.  Sedation was administered by anesthesia.  NHUNG probe inserted without complication and NHUNG performed.   Patient tolerated the procedure well without complication.  See full report for findings.

## 2023-04-18 NOTE — PROGRESS NOTE ADULT - PROBLEM SELECTOR PLAN 3
- Resolved.   - P/w WBC 11.3 with hypotension 85/70  - Afebrile  - Likely reactive to flash pulmonary edema  - Lactate 4.0 (likely due to flash pulmonary edema cause hypoxia) -> downtrending 3.5->2.8  - BCx: NGTD  - WBC WNL See above.

## 2023-04-18 NOTE — PROGRESS NOTE ADULT - PROVIDER SPECIALTY LIST ADULT
Cardiology
Cardiology
Internal Medicine
Cardiology
Cardiology
Internal Medicine

## 2023-04-18 NOTE — DISCHARGE NOTE PROVIDER - NSDCCPCAREPLAN_GEN_ALL_CORE_FT
PRINCIPAL DISCHARGE DIAGNOSIS  Diagnosis: Acute respiratory failure with hypoxia  Assessment and Plan of Treatment: You presented with shortness of breath to the ED. You were admitted for AHRF secondary to acute cardiogenic pulmonary edema, diastolic heart failure.  On admission you had shortness of breath and you were saturating 65%  on room air. You were started on supplemental oxygen with non-rebreather and then it was eventually titrated down. You are now breathing normally on room air. Your EKG on admission showed sinus tachycardia with age indeterminate septal infarct. Your troponin were negative on admission. You received CTA chest to rule out pulmonary embolism. It did not show PE but showed small bilateral pleural effusions. You were started on IV lasix which was eventually changed to oral lasix once patient clinically improved. You also received transthoracic echocardiogram which showed EF 55%, grade 2 diastolic dysfunction, mild MS, MR and AS. You also received transesophageal echocardiogram which showed confirmed the above mentioned findings. You also underwent stress test which was negative. You were was also started on carvedilol and lisinopril in the hospital. Your outpatient medication of enalapril, metoprolol and amlodipine was stopped.   Please follow with outpatient cardiologist with in 2 weeks of discharge for further management.   Take the following medications as prescribed.  1) Carvedilol 6.25mg one tablet twice a day.   2) Lisinopril 2.5mg one tablet once a day.      SECONDARY DISCHARGE DIAGNOSES  Diagnosis: Acute on chronic diastolic congestive heart failure  Assessment and Plan of Treatment: You received transthoracic echocardiogram which showed EF 55%, grade 2 diastolic dysfunction, mild MS, MR and AS. You also received transesophageal echocardiogram which showed confirmed the above mentioned findings.  Please follow with outpatient cardiologist with in 2 weeks of discharge for further management.   Take the following medications as prescribed.  1) Carvedilol 6.25mg one tablet twice a day.   2) Lisinopril 2.5mg one tablet once a day.    Diagnosis: Hypertension  Assessment and Plan of Treatment: You have history of hypertension. You were on metoprolol, enalapril and amlodipine. Your medications were stopped. You were started on carvedilol and lisinopril.   Please follow with outpatient cardiologist with in 2 weeks of discharge for further management.   Take the following medications as prescribed.  1) Carvedilol 6.25mg one tablet twice a day.   2) Lisinopril 2.5mg one tablet once a day.    Diagnosis: Hypotension  Assessment and Plan of Treatment: You had significant hypotension on admission. However, your blood pressure improved with IV fluids. You also had elevated lactate on admission likely due to severe hypotension which resolved.    Diagnosis: High serum lactate  Assessment and Plan of Treatment: See above.    Diagnosis: Diabetes mellitus  Assessment and Plan of Treatment: You have history of diabetes mellitus. You were started on a insulin sliding scale in patient.   Please continue taking your home medications on discharge as instructed.    Diagnosis: Iron deficiency anemia  Assessment and Plan of Treatment: You have history of iron deficiency anemia. Patient HB was stable during the course of the hospital stay.    Diagnosis: Gout  Assessment and Plan of Treatment: You have history of Gout. You were continued on her home dose of Allopurinol in the hospital.

## 2023-04-19 LAB
CORT UR RND CORT/CREAT RATIO: 14 MCG/G CR — SIGNIFICANT CHANGE UP (ref 0.7–85)
CREAT UR-MCNC: 83 MG/DL — SIGNIFICANT CHANGE UP (ref 16–326)
CULTURE RESULTS: SIGNIFICANT CHANGE UP
CULTURE RESULTS: SIGNIFICANT CHANGE UP
SPECIMEN SOURCE: SIGNIFICANT CHANGE UP
SPECIMEN SOURCE: SIGNIFICANT CHANGE UP

## 2023-05-24 ENCOUNTER — INPATIENT (INPATIENT)
Facility: HOSPITAL | Age: 68
LOS: 4 days | Discharge: HOME | End: 2023-05-29
Attending: HOSPITALIST | Admitting: HOSPITALIST
Payer: COMMERCIAL

## 2023-05-24 VITALS
RESPIRATION RATE: 18 BRPM | DIASTOLIC BLOOD PRESSURE: 63 MMHG | TEMPERATURE: 99 F | HEIGHT: 60.4 IN | SYSTOLIC BLOOD PRESSURE: 131 MMHG | OXYGEN SATURATION: 96 % | WEIGHT: 207.45 LBS | HEART RATE: 86 BPM

## 2023-05-24 DIAGNOSIS — M10.9 GOUT, UNSPECIFIED: ICD-10-CM

## 2023-05-24 DIAGNOSIS — I49.9 CARDIAC ARRHYTHMIA, UNSPECIFIED: ICD-10-CM

## 2023-05-24 DIAGNOSIS — J96.01 ACUTE RESPIRATORY FAILURE WITH HYPOXIA: ICD-10-CM

## 2023-05-24 DIAGNOSIS — I51.9 HEART DISEASE, UNSPECIFIED: ICD-10-CM

## 2023-05-24 DIAGNOSIS — Z90.49 ACQUIRED ABSENCE OF OTHER SPECIFIED PARTS OF DIGESTIVE TRACT: Chronic | ICD-10-CM

## 2023-05-24 DIAGNOSIS — I50.33 ACUTE ON CHRONIC DIASTOLIC (CONGESTIVE) HEART FAILURE: ICD-10-CM

## 2023-05-24 DIAGNOSIS — E11.9 TYPE 2 DIABETES MELLITUS WITHOUT COMPLICATIONS: ICD-10-CM

## 2023-05-24 PROBLEM — I10 ESSENTIAL (PRIMARY) HYPERTENSION: Chronic | Status: ACTIVE | Noted: 2023-04-14

## 2023-05-24 LAB
ALBUMIN SERPL ELPH-MCNC: 3.6 G/DL — SIGNIFICANT CHANGE UP (ref 3.3–5)
ALP SERPL-CCNC: 83 U/L — SIGNIFICANT CHANGE UP (ref 40–120)
ALT FLD-CCNC: 20 U/L — SIGNIFICANT CHANGE UP (ref 4–33)
ANION GAP SERPL CALC-SCNC: 14 MMOL/L — SIGNIFICANT CHANGE UP (ref 7–14)
AST SERPL-CCNC: 20 U/L — SIGNIFICANT CHANGE UP (ref 4–32)
BILIRUB SERPL-MCNC: 0.4 MG/DL — SIGNIFICANT CHANGE UP (ref 0.2–1.2)
BUN SERPL-MCNC: 16 MG/DL — SIGNIFICANT CHANGE UP (ref 7–23)
CALCIUM SERPL-MCNC: 9.2 MG/DL — SIGNIFICANT CHANGE UP (ref 8.4–10.5)
CHLORIDE SERPL-SCNC: 98 MMOL/L — SIGNIFICANT CHANGE UP (ref 98–107)
CO2 SERPL-SCNC: 24 MMOL/L — SIGNIFICANT CHANGE UP (ref 22–31)
CREAT SERPL-MCNC: 0.72 MG/DL — SIGNIFICANT CHANGE UP (ref 0.5–1.3)
EGFR: 92 ML/MIN/1.73M2 — SIGNIFICANT CHANGE UP
GLUCOSE SERPL-MCNC: 237 MG/DL — HIGH (ref 70–99)
HCT VFR BLD CALC: 32 % — LOW (ref 34.5–45)
HGB BLD-MCNC: 9.9 G/DL — LOW (ref 11.5–15.5)
MAGNESIUM SERPL-MCNC: 1.7 MG/DL — SIGNIFICANT CHANGE UP (ref 1.6–2.6)
MCHC RBC-ENTMCNC: 22.3 PG — LOW (ref 27–34)
MCHC RBC-ENTMCNC: 30.9 GM/DL — LOW (ref 32–36)
MCV RBC AUTO: 72.2 FL — LOW (ref 80–100)
NRBC # BLD: 0 /100 WBCS — SIGNIFICANT CHANGE UP (ref 0–0)
NRBC # FLD: 0 K/UL — SIGNIFICANT CHANGE UP (ref 0–0)
PHOSPHATE SERPL-MCNC: 3.2 MG/DL — SIGNIFICANT CHANGE UP (ref 2.5–4.5)
PLATELET # BLD AUTO: 289 K/UL — SIGNIFICANT CHANGE UP (ref 150–400)
POTASSIUM SERPL-MCNC: 3.9 MMOL/L — SIGNIFICANT CHANGE UP (ref 3.5–5.3)
POTASSIUM SERPL-SCNC: 3.9 MMOL/L — SIGNIFICANT CHANGE UP (ref 3.5–5.3)
PROT SERPL-MCNC: 7.5 G/DL — SIGNIFICANT CHANGE UP (ref 6–8.3)
RBC # BLD: 4.43 M/UL — SIGNIFICANT CHANGE UP (ref 3.8–5.2)
RBC # FLD: 17.3 % — HIGH (ref 10.3–14.5)
SODIUM SERPL-SCNC: 136 MMOL/L — SIGNIFICANT CHANGE UP (ref 135–145)
WBC # BLD: 9.48 K/UL — SIGNIFICANT CHANGE UP (ref 3.8–10.5)
WBC # FLD AUTO: 9.48 K/UL — SIGNIFICANT CHANGE UP (ref 3.8–10.5)

## 2023-05-24 PROCEDURE — 99223 1ST HOSP IP/OBS HIGH 75: CPT

## 2023-05-24 PROCEDURE — 93010 ELECTROCARDIOGRAM REPORT: CPT

## 2023-05-24 PROCEDURE — 99232 SBSQ HOSP IP/OBS MODERATE 35: CPT

## 2023-05-24 RX ORDER — GLUCAGON INJECTION, SOLUTION 0.5 MG/.1ML
1 INJECTION, SOLUTION SUBCUTANEOUS ONCE
Refills: 0 | Status: DISCONTINUED | OUTPATIENT
Start: 2023-05-24 | End: 2023-05-29

## 2023-05-24 RX ORDER — METFORMIN HYDROCHLORIDE 850 MG/1
1 TABLET ORAL
Refills: 0 | DISCHARGE

## 2023-05-24 RX ORDER — DEXTROSE 50 % IN WATER 50 %
25 SYRINGE (ML) INTRAVENOUS ONCE
Refills: 0 | Status: DISCONTINUED | OUTPATIENT
Start: 2023-05-24 | End: 2023-05-29

## 2023-05-24 RX ORDER — ALLOPURINOL 300 MG
1 TABLET ORAL
Refills: 0 | DISCHARGE

## 2023-05-24 RX ORDER — DEXTROSE 50 % IN WATER 50 %
15 SYRINGE (ML) INTRAVENOUS ONCE
Refills: 0 | Status: DISCONTINUED | OUTPATIENT
Start: 2023-05-24 | End: 2023-05-29

## 2023-05-24 RX ORDER — INSULIN LISPRO 100/ML
VIAL (ML) SUBCUTANEOUS
Refills: 0 | Status: DISCONTINUED | OUTPATIENT
Start: 2023-05-24 | End: 2023-05-29

## 2023-05-24 RX ORDER — CARVEDILOL PHOSPHATE 80 MG/1
6.25 CAPSULE, EXTENDED RELEASE ORAL EVERY 12 HOURS
Refills: 0 | Status: DISCONTINUED | OUTPATIENT
Start: 2023-05-24 | End: 2023-05-29

## 2023-05-24 RX ORDER — DEXTROSE 50 % IN WATER 50 %
12.5 SYRINGE (ML) INTRAVENOUS ONCE
Refills: 0 | Status: DISCONTINUED | OUTPATIENT
Start: 2023-05-24 | End: 2023-05-29

## 2023-05-24 RX ORDER — ALLOPURINOL 300 MG
100 TABLET ORAL DAILY
Refills: 0 | Status: DISCONTINUED | OUTPATIENT
Start: 2023-05-24 | End: 2023-05-29

## 2023-05-24 RX ORDER — LISINOPRIL 2.5 MG/1
2.5 TABLET ORAL DAILY
Refills: 0 | Status: DISCONTINUED | OUTPATIENT
Start: 2023-05-24 | End: 2023-05-29

## 2023-05-24 RX ORDER — SODIUM CHLORIDE 9 MG/ML
1000 INJECTION, SOLUTION INTRAVENOUS
Refills: 0 | Status: DISCONTINUED | OUTPATIENT
Start: 2023-05-24 | End: 2023-05-29

## 2023-05-24 RX ORDER — INSULIN LISPRO 100/ML
18 VIAL (ML) SUBCUTANEOUS
Refills: 0 | DISCHARGE

## 2023-05-24 RX ORDER — ASPIRIN/CALCIUM CARB/MAGNESIUM 324 MG
81 TABLET ORAL DAILY
Refills: 0 | Status: DISCONTINUED | OUTPATIENT
Start: 2023-05-24 | End: 2023-05-29

## 2023-05-24 RX ORDER — SEMAGLUTIDE 0.68 MG/ML
25 INJECTION, SOLUTION SUBCUTANEOUS
Refills: 0 | DISCHARGE

## 2023-05-24 RX ORDER — FUROSEMIDE 40 MG
40 TABLET ORAL DAILY
Refills: 0 | Status: DISCONTINUED | OUTPATIENT
Start: 2023-05-24 | End: 2023-05-29

## 2023-05-24 RX ADMIN — Medication 4: at 18:07

## 2023-05-24 RX ADMIN — Medication 100 MILLIGRAM(S): at 18:10

## 2023-05-24 RX ADMIN — Medication 81 MILLIGRAM(S): at 18:09

## 2023-05-24 RX ADMIN — LISINOPRIL 2.5 MILLIGRAM(S): 2.5 TABLET ORAL at 18:09

## 2023-05-24 NOTE — H&P ADULT - ASSESSMENT
Patient is a 67y old  Female with a PMH of DM, HTN, heart murmur, Gout, former smoker initially BIBEMS to  [facility] for respiratory distress. Subseq became unresponsive and lost her pulse; CPR initiated, ROSC less than 2 min per chart; pt Intubated and extubated shortly thereafter.  Pt noted to have prolonged pauses on tele and was tx to [facility] for PPM placement

## 2023-05-24 NOTE — CONSULT NOTE ADULT - SUBJECTIVE AND OBJECTIVE BOX
Source: patient and Chart    HPI:  Patient is a 67y old  Female with a PMH of DM, HTN, heart murmur, Gout, former smoker initially BIBEMS to  [facility] for respiratory distress. Per chart review, Pt. was noted hypertensive and hpoxic in the 50% in the field and CPAP was placed. Upon arrival to the ED, pt. was fuond to be unresponsive and pulseless with initial rhythm of asystole. 1 round of ACLS with ROSC achieved < 2 mins. Patient was intubated and admitted for acute hypoxemic respiratory failure, subsuquently extubated. Patient is transferred to [facility] for pacemaker implant due to prolonged pause 5.2ses on MOCT(outpt. cardiac telemetry monitoring). Currently patient is AAOx4, and hemodynamically stable. Per patient, she has been experiencing SOB since Feb. this year and she was recently admitted to [facility] for AHRF secondary to acute cardiogenic pulmonary edema and was discharged. Reports has been following up with her Cardiologist Dr. Pacheco and was placed on MOCT for prolonged cardiac monitoring. Had recent TTE and stress test. She was on Carvedilol, ACE and Lasix. Denies CP, palpitation, dizziness, near syncope or syncope. Denies family hx of SCD. TTE on 5/23/23 reveals hyperdynamic LV function, LVEF 65%, severe LA volum index, mod. AS. TTE on 4/18 shows LVEF 55-60%, Mod MS, Mild AR, MR. Tele reveals SR currently.       PAST MEDICAL & SURGICAL HISTORY:  DM (diabetes mellitus)      HTN (hypertension)      S/P cholecystectomy            MEDICATIONS  (STANDING):    MEDICATIONS  (PRN):      FAMILY HISTORY:  FH: COPD (chronic obstructive pulmonary disease) (Father)        SOCIAL HISTORY:    LIVING SITUATION:  CIGARETTES: Denied  ALCOHOL: denied   ILLICIT DRUG USES: denied    REVIEW OF SYSTEMS:  CONSTITUTIONAL: No fever, weight loss, chills, shakes, or fatigue, + SOB   EYES: No eye pain, visual disturbances, or discharge  ENMT:  No difficulty hearing, tinnitus, vertigo; No sinus or throat pain  NECK: No pain or stiffness  RESPIRATORY: No cough, wheezing, hemoptysis, or shortness of breath  CARDIOVASCULAR: No chest pain, dyspnea, palpitations, dizziness, syncope, paroxysmal nocturnal dyspnea, orthopnea, or arm or leg swelling  GASTROINTESTINAL: No abdominal  or epigastric pain, nausea, vomiting, hematemesis, diarrhea, constipation, melena or bright red blood.  GENITOURINARY: No dysuria, nocturia, hematuria, or urinary incontinence  NEUROLOGICAL: No headaches, memory loss, slurred speech, limb weakness, loss of strength, numbness, or tremors  MUSCULOSKELETAL: No joint pain or swelling, muscle, back, or extremity pain  PSYCHIATRIC: No depression, anxiety, or difficulty sleeping        Vital Signs Last 24 Hrs  T(C): 37.2 (24 May 2023 07:45), Max: 37.2 (24 May 2023 07:45)  T(F): 99 (24 May 2023 07:45), Max: 99 (24 May 2023 07:45)  HR: 86 (24 May 2023 07:45) (86 - 86)  BP: 131/63 (24 May 2023 07:45) (131/63 - 131/63)  BP(mean): --  RR: 18 (24 May 2023 07:45) (18 - 18)  SpO2: 96% (24 May 2023 07:45) (96% - 96%)    Parameters below as of 24 May 2023 07:45  Patient On (Oxygen Delivery Method): nasal cannula  O2 Flow (L/min): 3      PHYSICAL EXAM:  GENERAL: Well appearing, speaking in full sentence, in NAD  HEAD:  Atraumatic, Normocephalic  EYES: EOMI, PERRLA, conjunctiva and sclera clear  ENMT: No tonsillar erythema, exudates, or enlargement; Moist mucous membranes, Good dentition, No lesions  NECK: Supple and normal thyroid.  No JVD or carotid bruit.  Carotid pulse is 2+ bilaterally.  HEART: S1S2 RRR; 2/6 systoli murmurs, rubs, or gallops appreciated .  PULMONARY:  CTABL, normal respiratory effort.  able to speak full sentence   ABDOMEN: Bowel sounds present, soft, NDNT  EXTREMITIES:  Warm, well -perfused, no pedal edema, distal pulses present  NEUROLOGICAL:AOx3 ,  motor function grossly  intact    INTERPRETATION OF TELEMETRY: SR at 70s    ECG:    I&O's Detail      LABS:                  BNP  I&O's Detail    Daily     Daily     RADIOLOGY & ADDITIONAL STUDIES:  Patient name: LINDSAY FERRER  YOB: 1955   Age: 67 (F)   MR#: [id number]  Study Date: 4/18/2023  Location: [address] [place], [place] [address]er: [facility]  Study quality: Technically good  Referring Physician: Manuelito Aguirre MD  Blood Pressure: 136/73 mmHg  Height: 157 cm  Weight: 88 kg  BSA: 1.9 m2  ------------------------------------------------------------------------    PROCEDURE: Transesophageal and transthoracic echocardiogram  was performed in the echocardiography laboratory.  The  patient was sedated with Propofol  250 mg IV.  The  procedure was monitored with automatic blood pressure  monitoring,  ECG tracings and pulse oximetry. The  transesophageal probe was placed in the esophagus posterior  to the heart without any complications.  The patient  tolerated the procedure well.  Given _10_cc agitated saline intravenously.  INDICATION: Cardiomyopathy, unspecified (I42.9)  HISTORY:  ------------------------------------------------------------------------  DIMENSIONS:  Dimensions:    Normal Values:  LA:       ---     2.0 - 4.0 cm  Ao:     3.3 cm    2.0 - 3.8 cm  SEPTUM:   ---     0.6 - 1.2 cm  PWT:      ---     0.6 - 1.1 cm  LVIDd:    ---     3.0 - 5.6 cm  LVIDs:    ---     1.8 - 4.0 cm      Ejection Fraction Visual Estimate:55-60 %    ------------------------------------------------------------------------  OBSERVATIONS:  Mitral Valve: Mitral annular calcification, and calcified  mitral leaflets with reduced diastolic opening. Mild mitral  regurgitation.  Peak mitral valve gradient equals 15 mm Hg,  mean transmitral valve gradient equals 7 mm Hg, consistent  with moderate mitral stenosis.  Aortic Root: Aortic Root: 3.3 cm.Normal aortic root, aortic  arch and descending thoracic aorta.Grade I atheroma in  descending aorta.    Aortic Valve: Calcified trileaflet aortic valve with  decreased opening. Estimated aortic valve area equals 1.8  sqcm (byplanimetry), consistent with mild aortic stenosis.  Mild aortic regurgitation.  Left Atrium: Normal left atrium. No leftatrial or left  atrial appendage thrombus. Spontaneus echo contrast seen.  Left Ventricle: Normal Left Ventricular Systolic Function,  (EF = 55 to 60%) Normal left ventricular internal  dimensions and wall thicknesses. Diastolic function not  assessed.  Right Heart: Normal right atrium. Normal right ventricular  size and function. There is trace tricuspid regurgitation.  Normal pulmonic valve.  Pericardium/PleuraNormal pericardium with no pericardial  effusion.  Hemodynamic: Unable to estimate RVSP. Agitated saline  injection was negative for intracardiac shunt.  ------------------------------------------------------------------------  CONCLUSIONS:  1. Mitral annular calcification, and calcified mitral  leaflets with reduced diastolic opening. Mild mitral  regurgitation.  Peak mitral valve gradient equals 15 mm Hg,  mean transmitral valve gradient equals 7 mm Hg, consistent  with moderate mitral stenosis.  2. Calcified trileaflet aortic valve with decreased  opening. Estimated aortic valve area equals 1.8 sqcm  (byplanimetry), consistent with mild aortic stenosis. Mild  aortic regurgitation.  3. Aortic Root: 3.3 cm.Normal aortic root, aortic arch and  descending thoracic aorta.Grade I atheroma in descending  aorta.    4. Normal left atrium. No left atrial or left atrial  appendage thrombus. Spontaneus echo contrast seen.  5. Normal left ventricular internal dimensions and wall  thicknesses.  6. Normal Left Ventricular Systolic Function,  (EF = 55 to  60%)  7. Diastolic function not assessed.  8. Normal right atrium.  9. Normal right ventricular size and function.  10. Unable to estimate RVSP.  11. There is trace tricuspid regurgitation.  12. Normal pulmonic valve.  13. Agitated saline injection was negative for intracardiac  shunt.  14. Normal pericardium with no pericardial effusion.    ------------------------------------------------------------------------  Confirmed on  4/18/2023 - 11:43:16 by Ning Hall MD

## 2023-05-24 NOTE — H&P ADULT - PROBLEM SELECTOR PLAN 4
cont coreg, ACE, lasix for now  seems euvolemic cont coreg, ACE, lasix for now  seems euvolemic  lipid profile in AM

## 2023-05-24 NOTE — PATIENT PROFILE ADULT - FUNCTIONAL ASSESSMENT - BASIC MOBILITY 6.
4-calculated by average/Not able to assess (calculate score using [facility] averaging method)

## 2023-05-24 NOTE — PATIENT PROFILE ADULT - FALL HARM RISK - HARM RISK INTERVENTIONS
LCSW spoke with [employer] to discuss Joseph's upcoming school year. Staff report that he is scheduled to start school next week and is repeating  due to nmissing so much the year before. LCSW questioned whether transportation was set up and if there would be an option for a shortened day if mom thought full days were too much. A message was left for the  and LCSW will follow up when a return call is made.
Assistance with ambulation/Assistance OOB with selected safe patient handling equipment/Communicate Risk of Fall with Harm to all staff/Discuss with provider need for PT consult/Monitor gait and stability/Provide patient with walking aids - walker, cane, crutches/Reinforce activity limits and safety measures with patient and family/Tailored Fall Risk Interventions/Use of alarms - bed, chair and/or voice tab/Visual Cue: Yellow wristband and red socks/Bed in lowest position, wheels locked, appropriate side rails in place/Call bell, personal items and telephone in reach/Instruct patient to call for assistance before getting out of bed or chair/Non-slip footwear when patient is out of bed/[place] to call system/Physically safe environment - no spills, clutter or unnecessary equipment/Purposeful Proactive Rounding/Room/bathroom lighting operational, light cord in reach

## 2023-05-24 NOTE — H&P ADULT - HISTORY OF PRESENT ILLNESS
Patient is a 67y old  Female with a PMH of DM, HTN, heart murmur, Gout, former smoker initially BIBEMS to  [facility] for respiratory distress. Per chart review, Pt. was noted hypertensive and hpoxic in the 50% in the field and CPAP was placed. Upon arrival to the ED, pt. was fuond to be unresponsive and pulseless with initial rhythm of asystole. 1 round of ACLS with ROSC achieved < 2 mins. Patient was intubated and admitted for acute hypoxemic respiratory failure, subsuquently extubated. Patient is transferred to [facility] for pacemaker implant due to prolonged pause 5.2ses on MOCT(outpt. cardiac telemetry monitoring). Currently patient is AAOx4, and hemodynamically stable. Per patient, she has been experiencing SOB since Feb. this year and she was recently admitted to [facility] for AHRF secondary to acute cardiogenic pulmonary edema and was discharged. Reports has been following up with her Cardiologist Dr. Pacheco and was placed on MOCT for prolonged cardiac monitoring. Had recent TTE and stress test. She was on Carvedilol, ACE and Lasix. Denies CP, palpitation, dizziness, near syncope or syncope. Denies family hx of SCD. TTE on 5/23/23 reveals hyperdynamic LV function, LVEF 65%, severe LA volum index, mod. AS. TTE on 4/18 shows LVEF 55-60%, Mod MS, Mild AR, MR. Tele reveals SR currently.  Patient is a 67y old  Female with a PMH of DM, HTN, heart murmur, Gout, former smoker initially BIBEMS to [facility] for respiratory distress. Per chart review, Pt. was noted hypertensive and hypoxic in the 50% in the field and CPAP was placed. Upon arrival to the ED, pt. was found to be unresponsive and pulseless with initial rhythm of asystole. 1 round of ACLS with ROSC achieved < 2 mins. Patient was intubated and admitted for acute hypoxic respiratory failure, and then subsuquently extubated. Patient is transferred to [facility] for pacemaker implant due to prolonged pause 5.2ses on MOCT(outpt. cardiac telemetry monitoring). Currently patient is AAOx4, and hemodynamically stable. Per patient, she has been experiencing SOB since Feb. this year and she was recently admitted to [facility] for AHRF secondary to acute cardiogenic pulmonary edema. Reports she has been following up with her Cardiologist Dr. Pacheco and was placed on MOCT for prolonged cardiac monitoring. She had recent TTE and stress test. She was on Carvedilol, ACE and Lasix. Denies CP, palpitation, dizziness, near syncope or syncope. Denies family hx of sudden cardiac death.. TTE on 5/23/23 reveals hyperdynamic LV function, LVEF 65%, severe LA volum index, mod. AS. TTE on 4/18 shows LVEF 55-60%, Mod MS, Mild AR, MR. Tele reveals SR currently.     Pt reports since CPR, she has muscular pain in her chest which is limiting her deep breathing and is complaining of cough with phlegm.  D/w pt importance of incentive spirometry.

## 2023-05-24 NOTE — H&P ADULT - PROBLEM SELECTOR PLAN 5
takes metformin and ozempic at home  FS QID  NSC/DASH diet  check a1c  sliding scale for now to determine poss basal bolus dosing

## 2023-05-24 NOTE — H&P ADULT - ENMT
Administrative documentation to document COVID-19 status.  COVID-19 status: Most recent Covid-19 test positive     negative

## 2023-05-24 NOTE — CONSULT NOTE ADULT - NS ATTEND AMEND GEN_ALL_CORE FT
68 y/o Female with a PMHx of DM, HTN, heart murmur, Gout, former smoker who present to [facility] in asystole. She is transferred to [facility] for PPM placement. Plan for device placement tomorrow.

## 2023-05-24 NOTE — CONSULT NOTE ADULT - ASSESSMENT
Patient is a 67y old  Female with a PMH of DM, HTN, heart murmur, Gout, former smoker initially BIBEMS to  [facility] for respiratory distress. Per chart review, Pt. was noted hypertensive and hpoxic in the 50% in the field and CPAP was placed. Upon arrival to the ED, pt. was fuond to be unresponsive and pulseless with initial rhythm of asystole. 1 round of ACLS with ROSC achieved < 2 mins. Patient was intubated and admitted for acute hypoxemic respiratory failure, subsuquently extubated. Patient is transferred to [facility] for pacemaker implant due to prolonged pause 5.2ses on MOCT(outpt. cardiac telemetry monitoring). Currently patient is AAOx4, and hemodynamically stable. Per patient, she has been experiencing SOB since Feb. this year and she was recently admitted to [facility] for AHRF secondary to acute cardiogenic pulmonary edema and was discharged. Reports has been following up with her Cardiologist Dr. Pacheco and was placed on MOCT for prolonged cardiac monitoring. Had recent TTE and stress test. She was on Carvedilol, ACE and Lasix. Denies CP, palpitation, dizziness, near syncope or syncope. Denies family hx of SCD. TTE on 5/23/23 reveals hyperdynamic LV function, LVEF 65%, severe LA volum index, mod. AS. TTE on 4/18 shows LVEF 55-60%, Mod MS, Mild AR, MR. Tele reveals SR currently.     ## prolonged pause 5.2 secs   ## AHRF s/p CPR, intubation and extubation--currently on 3L O2 via NC     RECOMMENDATIONS:  - In light of prolonged pause, pt. would benefit from PPM. The process of the procedure along with the risks and benefits for each procedure were explained in detail which included but not limited to bleeding requiring transfusion, infection, stroke, plural effusion, esophageal injury, pericardial effusion, cardiac tamponade requiring chest tube, intubation, and death. Patient expressed understanding and all questions were answered. Patient is agreeable  - Plan for PPM when patient is ready and respiratory status improves, tentatively on 5/25   - Hold AVN blocking agents  - NPO after MN, AM labs    - Continuous telemetric monitoring  - Obtain EKG   - Monitor electrolytes and replete K to 4 and Mg to 2  - Continue care per primary team    Patient to be staffed with attending. Please await attending addendum Patient is a 67y old  Female with a PMH of DM, HTN, heart murmur, Gout, former smoker initially BIBEMS to  [facility] for respiratory distress. Per chart review, Pt. was noted hypertensive and hpoxic in the 50% in the field and CPAP was placed. Upon arrival to the ED, pt. was fuond to be unresponsive and pulseless with initial rhythm of asystole. 1 round of ACLS with ROSC achieved < 2 mins. Patient was intubated and admitted for acute hypoxemic respiratory failure, subsuquently extubated. Patient is transferred to [facility] for pacemaker implant due to prolonged pause 5.2ses on MOCT(outpt. cardiac telemetry monitoring). Currently patient is AAOx4, and hemodynamically stable. Per patient, she has been experiencing SOB since Feb. this year and she was recently admitted to [facility] for AHRF secondary to acute cardiogenic pulmonary edema and was discharged. Reports has been following up with her Cardiologist Dr. Pacheco and was placed on MOCT for prolonged cardiac monitoring. Had recent TTE and stress test. She was on Carvedilol, ACE and Lasix. Denies CP, palpitation, dizziness, near syncope or syncope. Denies family hx of SCD. TTE on 5/23/23 reveals hyperdynamic LV function, LVEF 65%, severe LA volum index, mod. AS. TTE on 4/18 shows LVEF 55-60%, Mod MS, Mild AR, MR. Tele reveals SR currently.     ## prolonged pause 5.2 secs   ## AHRF s/p CPR, intubation and extubation--currently on 3L O2 via NC     RECOMMENDATIONS:  - In light of prolonged pause, pt. would benefit from PPM. The process of the procedure along with the risks and benefits for each procedure were explained in detail which included but not limited to bleeding requiring transfusion, infection, stroke, plural effusion, esophageal injury, pericardial effusion, cardiac tamponade requiring chest tube, intubation, and death. Patient expressed understanding and all questions were answered. Patient is agreeable and consent obtained  - PPM on 5/25  - Hold AVN blocking agents  - NPO after MN, AM labs    - Continuous telemetric monitoring  - Obtain EKG   - Monitor electrolytes and replete K to 4 and Mg to 2  - Continue care per primary team    Patient to be staffed with attending. Please await attending addendum

## 2023-05-25 LAB
ANION GAP SERPL CALC-SCNC: 11 MMOL/L — SIGNIFICANT CHANGE UP (ref 7–14)
BUN SERPL-MCNC: 15 MG/DL — SIGNIFICANT CHANGE UP (ref 7–23)
CALCIUM SERPL-MCNC: 9.5 MG/DL — SIGNIFICANT CHANGE UP (ref 8.4–10.5)
CHLORIDE SERPL-SCNC: 99 MMOL/L — SIGNIFICANT CHANGE UP (ref 98–107)
CO2 SERPL-SCNC: 26 MMOL/L — SIGNIFICANT CHANGE UP (ref 22–31)
CREAT SERPL-MCNC: 0.69 MG/DL — SIGNIFICANT CHANGE UP (ref 0.5–1.3)
EGFR: 95 ML/MIN/1.73M2 — SIGNIFICANT CHANGE UP
GLUCOSE BLDC GLUCOMTR-MCNC: 138 MG/DL — HIGH (ref 70–99)
GLUCOSE BLDC GLUCOMTR-MCNC: 174 MG/DL — HIGH (ref 70–99)
GLUCOSE BLDC GLUCOMTR-MCNC: 176 MG/DL — HIGH (ref 70–99)
GLUCOSE BLDC GLUCOMTR-MCNC: 195 MG/DL — HIGH (ref 70–99)
GLUCOSE SERPL-MCNC: 193 MG/DL — HIGH (ref 70–99)
HCT VFR BLD CALC: 31.4 % — LOW (ref 34.5–45)
HGB BLD-MCNC: 9.7 G/DL — LOW (ref 11.5–15.5)
MAGNESIUM SERPL-MCNC: 1.8 MG/DL — SIGNIFICANT CHANGE UP (ref 1.6–2.6)
MCHC RBC-ENTMCNC: 21.8 PG — LOW (ref 27–34)
MCHC RBC-ENTMCNC: 30.9 GM/DL — LOW (ref 32–36)
MCV RBC AUTO: 70.6 FL — LOW (ref 80–100)
NRBC # BLD: 0 /100 WBCS — SIGNIFICANT CHANGE UP (ref 0–0)
NRBC # FLD: 0 K/UL — SIGNIFICANT CHANGE UP (ref 0–0)
PHOSPHATE SERPL-MCNC: 3.4 MG/DL — SIGNIFICANT CHANGE UP (ref 2.5–4.5)
PLATELET # BLD AUTO: 295 K/UL — SIGNIFICANT CHANGE UP (ref 150–400)
POTASSIUM SERPL-MCNC: 4.3 MMOL/L — SIGNIFICANT CHANGE UP (ref 3.5–5.3)
POTASSIUM SERPL-SCNC: 4.3 MMOL/L — SIGNIFICANT CHANGE UP (ref 3.5–5.3)
RBC # BLD: 4.45 M/UL — SIGNIFICANT CHANGE UP (ref 3.8–5.2)
RBC # FLD: 17.3 % — HIGH (ref 10.3–14.5)
SODIUM SERPL-SCNC: 136 MMOL/L — SIGNIFICANT CHANGE UP (ref 135–145)
WBC # BLD: 10.98 K/UL — HIGH (ref 3.8–10.5)
WBC # FLD AUTO: 10.98 K/UL — HIGH (ref 3.8–10.5)

## 2023-05-25 PROCEDURE — 71045 X-RAY EXAM CHEST 1 VIEW: CPT | Mod: 26

## 2023-05-25 PROCEDURE — 33208 INSRT HEART PM ATRIAL & VENT: CPT

## 2023-05-25 PROCEDURE — 99233 SBSQ HOSP IP/OBS HIGH 50: CPT

## 2023-05-25 PROCEDURE — 99232 SBSQ HOSP IP/OBS MODERATE 35: CPT | Mod: 25

## 2023-05-25 RX ORDER — VANCOMYCIN HCL 1 G
1000 VIAL (EA) INTRAVENOUS ONCE
Refills: 0 | Status: COMPLETED | OUTPATIENT
Start: 2023-05-26 | End: 2023-05-26

## 2023-05-25 RX ORDER — SODIUM CHLORIDE 9 MG/ML
1000 INJECTION, SOLUTION INTRAVENOUS
Refills: 0 | Status: DISCONTINUED | OUTPATIENT
Start: 2023-05-25 | End: 2023-05-26

## 2023-05-25 RX ORDER — INSULIN LISPRO 100/ML
VIAL (ML) SUBCUTANEOUS AT BEDTIME
Refills: 0 | Status: DISCONTINUED | OUTPATIENT
Start: 2023-05-25 | End: 2023-05-29

## 2023-05-25 RX ADMIN — Medication 81 MILLIGRAM(S): at 12:43

## 2023-05-25 RX ADMIN — Medication 2: at 12:37

## 2023-05-25 RX ADMIN — LISINOPRIL 2.5 MILLIGRAM(S): 2.5 TABLET ORAL at 06:16

## 2023-05-25 RX ADMIN — Medication 100 MILLIGRAM(S): at 12:43

## 2023-05-25 RX ADMIN — Medication 40 MILLIGRAM(S): at 06:16

## 2023-05-25 NOTE — PROGRESS NOTE ADULT - ASSESSMENT
Patient is a 67y old  Female with a PMH of DM, HTN, heart murmur, Gout, former smoker initially BIBEMS to  [facility] for respiratory distress. Per chart review, Pt. was noted hypertensive and hpoxic in the 50% in the field and CPAP was placed. Upon arrival to the ED, pt. was fuond to be unresponsive and pulseless with initial rhythm of asystole. 1 round of ACLS with ROSC achieved < 2 mins. Patient was intubated and admitted for acute hypoxemic respiratory failure, subsuquently extubated. Patient is transferred to [facility] for pacemaker implant due to prolonged pause 5.2ses on MOCT(outpt. cardiac telemetry monitoring). Currently patient is AAOx4, and hemodynamically stable. Per patient, she has been experiencing SOB since Feb. this year and she was recently admitted to [facility] for AHRF secondary to acute cardiogenic pulmonary edema and was discharged. Reports has been following up with her Cardiologist Dr. Pacheco and was placed on MOCT for prolonged cardiac monitoring. Had recent TTE and stress test. She was on Carvedilol, ACE and Lasix. Denies CP, palpitation, dizziness, near syncope or syncope. Denies family hx of SCD. TTE on 5/23/23 reveals hyperdynamic LV function, LVEF 65%, severe LA volum index, mod. AS. TTE on 4/18 shows LVEF 55-60%, Mod MS, Mild AR, MR. Tele reveals SR currently.     ## prolonged pause 5.2 secs   ## AHRF s/p CPR, intubation and extubation--currently on 3L O2 via NC     RECOMMENDATIONS:  - NPO for anticipated PPM , AM labs  - Hold AVN blocking agents  - Continuous telemetric monitoring  - Monitor electrolytes and replete K to 4 and Mg to 2  - Continue care per primary team

## 2023-05-25 NOTE — PROGRESS NOTE ADULT - ASSESSMENT
Patient is a 67y old  Female with a PMH of DM, HTN, heart murmur, Gout, former smoker initially BIBEMS to  [facility] for respiratory distress. Subseq became unresponsive and lost her pulse; CPR initiated, ROSC less than 2 min per chart; pt Intubated and extubated shortly thereafter.  Pt noted to have prolonged pauses on tele and was tx to [facility] for PPM placement

## 2023-05-26 LAB
A1C WITH ESTIMATED AVERAGE GLUCOSE RESULT: 7.8 % — HIGH (ref 4–5.6)
ALBUMIN SERPL ELPH-MCNC: 3.5 G/DL — SIGNIFICANT CHANGE UP (ref 3.3–5)
ALP SERPL-CCNC: 77 U/L — SIGNIFICANT CHANGE UP (ref 40–120)
ALT FLD-CCNC: 14 U/L — SIGNIFICANT CHANGE UP (ref 4–33)
ANION GAP SERPL CALC-SCNC: 12 MMOL/L — SIGNIFICANT CHANGE UP (ref 7–14)
AST SERPL-CCNC: 20 U/L — SIGNIFICANT CHANGE UP (ref 4–32)
BILIRUB SERPL-MCNC: 0.4 MG/DL — SIGNIFICANT CHANGE UP (ref 0.2–1.2)
BUN SERPL-MCNC: 16 MG/DL — SIGNIFICANT CHANGE UP (ref 7–23)
CALCIUM SERPL-MCNC: 9.1 MG/DL — SIGNIFICANT CHANGE UP (ref 8.4–10.5)
CHLORIDE SERPL-SCNC: 98 MMOL/L — SIGNIFICANT CHANGE UP (ref 98–107)
CHOLEST SERPL-MCNC: 110 MG/DL — SIGNIFICANT CHANGE UP
CO2 SERPL-SCNC: 25 MMOL/L — SIGNIFICANT CHANGE UP (ref 22–31)
CREAT SERPL-MCNC: 0.72 MG/DL — SIGNIFICANT CHANGE UP (ref 0.5–1.3)
EGFR: 92 ML/MIN/1.73M2 — SIGNIFICANT CHANGE UP
ESTIMATED AVERAGE GLUCOSE: 177 — SIGNIFICANT CHANGE UP
GLUCOSE BLDC GLUCOMTR-MCNC: 191 MG/DL — HIGH (ref 70–99)
GLUCOSE BLDC GLUCOMTR-MCNC: 202 MG/DL — HIGH (ref 70–99)
GLUCOSE BLDC GLUCOMTR-MCNC: 211 MG/DL — HIGH (ref 70–99)
GLUCOSE BLDC GLUCOMTR-MCNC: 229 MG/DL — HIGH (ref 70–99)
GLUCOSE BLDC GLUCOMTR-MCNC: 231 MG/DL — HIGH (ref 70–99)
GLUCOSE SERPL-MCNC: 218 MG/DL — HIGH (ref 70–99)
HCT VFR BLD CALC: 31.2 % — LOW (ref 34.5–45)
HDLC SERPL-MCNC: 36 MG/DL — LOW
HGB BLD-MCNC: 9.6 G/DL — LOW (ref 11.5–15.5)
LIPID PNL WITH DIRECT LDL SERPL: 57 MG/DL — SIGNIFICANT CHANGE UP
MAGNESIUM SERPL-MCNC: 1.9 MG/DL — SIGNIFICANT CHANGE UP (ref 1.6–2.6)
MCHC RBC-ENTMCNC: 21.5 PG — LOW (ref 27–34)
MCHC RBC-ENTMCNC: 30.8 GM/DL — LOW (ref 32–36)
MCV RBC AUTO: 70 FL — LOW (ref 80–100)
NON HDL CHOLESTEROL: 74 MG/DL — SIGNIFICANT CHANGE UP
NRBC # BLD: 0 /100 WBCS — SIGNIFICANT CHANGE UP (ref 0–0)
NRBC # FLD: 0 K/UL — SIGNIFICANT CHANGE UP (ref 0–0)
PHOSPHATE SERPL-MCNC: 3.5 MG/DL — SIGNIFICANT CHANGE UP (ref 2.5–4.5)
PLATELET # BLD AUTO: 285 K/UL — SIGNIFICANT CHANGE UP (ref 150–400)
POTASSIUM SERPL-MCNC: 3.9 MMOL/L — SIGNIFICANT CHANGE UP (ref 3.5–5.3)
POTASSIUM SERPL-SCNC: 3.9 MMOL/L — SIGNIFICANT CHANGE UP (ref 3.5–5.3)
PROT SERPL-MCNC: 7.4 G/DL — SIGNIFICANT CHANGE UP (ref 6–8.3)
RBC # BLD: 4.46 M/UL — SIGNIFICANT CHANGE UP (ref 3.8–5.2)
RBC # FLD: 17.2 % — HIGH (ref 10.3–14.5)
SODIUM SERPL-SCNC: 135 MMOL/L — SIGNIFICANT CHANGE UP (ref 135–145)
TRIGL SERPL-MCNC: 83 MG/DL — SIGNIFICANT CHANGE UP
TSH SERPL-MCNC: 1.75 UIU/ML — SIGNIFICANT CHANGE UP (ref 0.27–4.2)
WBC # BLD: 8.92 K/UL — SIGNIFICANT CHANGE UP (ref 3.8–10.5)
WBC # FLD AUTO: 8.92 K/UL — SIGNIFICANT CHANGE UP (ref 3.8–10.5)

## 2023-05-26 PROCEDURE — 99231 SBSQ HOSP IP/OBS SF/LOW 25: CPT

## 2023-05-26 PROCEDURE — 99232 SBSQ HOSP IP/OBS MODERATE 35: CPT

## 2023-05-26 RX ORDER — SODIUM CHLORIDE 9 MG/ML
4 INJECTION INTRAMUSCULAR; INTRAVENOUS; SUBCUTANEOUS EVERY 12 HOURS
Refills: 0 | Status: DISCONTINUED | OUTPATIENT
Start: 2023-05-26 | End: 2023-05-27

## 2023-05-26 RX ORDER — OXYCODONE HYDROCHLORIDE 5 MG/1
5 TABLET ORAL EVERY 8 HOURS
Refills: 0 | Status: DISCONTINUED | OUTPATIENT
Start: 2023-05-26 | End: 2023-05-29

## 2023-05-26 RX ORDER — ACETAMINOPHEN 500 MG
650 TABLET ORAL EVERY 6 HOURS
Refills: 0 | Status: DISCONTINUED | OUTPATIENT
Start: 2023-05-26 | End: 2023-05-29

## 2023-05-26 RX ORDER — OXYCODONE HYDROCHLORIDE 5 MG/1
5 TABLET ORAL ONCE
Refills: 0 | Status: DISCONTINUED | OUTPATIENT
Start: 2023-05-26 | End: 2023-05-26

## 2023-05-26 RX ADMIN — Medication 4: at 08:40

## 2023-05-26 RX ADMIN — LISINOPRIL 2.5 MILLIGRAM(S): 2.5 TABLET ORAL at 05:22

## 2023-05-26 RX ADMIN — OXYCODONE HYDROCHLORIDE 5 MILLIGRAM(S): 5 TABLET ORAL at 04:08

## 2023-05-26 RX ADMIN — Medication 40 MILLIGRAM(S): at 05:22

## 2023-05-26 RX ADMIN — Medication 100 MILLIGRAM(S): at 20:55

## 2023-05-26 RX ADMIN — CARVEDILOL PHOSPHATE 6.25 MILLIGRAM(S): 80 CAPSULE, EXTENDED RELEASE ORAL at 05:23

## 2023-05-26 RX ADMIN — SODIUM CHLORIDE 4 MILLILITER(S): 9 INJECTION INTRAMUSCULAR; INTRAVENOUS; SUBCUTANEOUS at 23:23

## 2023-05-26 RX ADMIN — CARVEDILOL PHOSPHATE 6.25 MILLIGRAM(S): 80 CAPSULE, EXTENDED RELEASE ORAL at 18:12

## 2023-05-26 RX ADMIN — SODIUM CHLORIDE 75 MILLILITER(S): 9 INJECTION, SOLUTION INTRAVENOUS at 14:20

## 2023-05-26 RX ADMIN — Medication 100 MILLIGRAM(S): at 12:24

## 2023-05-26 RX ADMIN — OXYCODONE HYDROCHLORIDE 5 MILLIGRAM(S): 5 TABLET ORAL at 03:38

## 2023-05-26 RX ADMIN — Medication 250 MILLIGRAM(S): at 08:39

## 2023-05-26 RX ADMIN — Medication 4: at 12:24

## 2023-05-26 RX ADMIN — Medication 81 MILLIGRAM(S): at 12:24

## 2023-05-26 RX ADMIN — Medication 4: at 18:12

## 2023-05-26 NOTE — PROVIDER CONTACT NOTE (OTHER) - ACTION/TREATMENT ORDERED:
Provider stated "pt does not need PIV access at this time." Provider to RN order will be placed. Possible discharge to home tomorrow. (5/27)

## 2023-05-26 NOTE — PROGRESS NOTE ADULT - ASSESSMENT
Patient is a 67y old  Female with a PMH of DM, HTN, heart murmur, Gout, former smoker initially BIBEMS to  [facility] for respiratory distress. Subseq became unresponsive and lost her pulse; CPR initiated, ROSC less than 2 min per chart; pt Intubated and extubated shortly thereafter.  Pt noted to have prolonged pauses on tele and was tx to [facility] for PPM placement

## 2023-05-26 NOTE — PROGRESS NOTE ADULT - ASSESSMENT
Patient is a 67y old  Female with a PMH of DM, HTN, heart murmur, Gout, former smoker initially BIBEMS to  [facility] for respiratory distress. Per chart review, Pt. was noted hypertensive and hpoxic in the 50% in the field and CPAP was placed. Upon arrival to the ED, pt. was found to be unresponsive and pulseless with initial rhythm of asystole. 1 round of ACLS with ROSC achieved < 2 mins. Patient was intubated and admitted for acute hypoxemic respiratory failure, subsuquently extubated. Patient was transferred to [facility] for pacemaker implant due to prolonged pause 5.2ses on MOCT(outpt. cardiac telemetry monitoring). Denies family hx of SCD. TTE on 5/23/23 reveals hyperdynamic LV function, LVEF 65%, severe LA volum index, mod. AS. TTE on 4/18 shows LVEF 55-60%, Mod MS, Mild AR, MR.   ## prolonged pause 5.2 secs; s/p PPM placement  ## AHRF s/p CPR, intubation and extubation--currently on 3L O2 via NC     RECOMMENDATIONS:  - Resume AV romana blockers as needed  - Continuous telemetric monitoring while inpatient  - Monitor electrolytes and replete K to 4 and Mg to 2  PPM site with dressing. It was clean, dry, and intact. No bleeding, drainage hematoma, or ecchymosis appreciated.      s/p PPM  Post-op PPM instructions have been verbally explained and given to the patient. Patient expressed understanding and all questions were answered. A copy of the instruction is located in the patients chart   Patient is schedule for an appointment on 6/7/2023 @ 09:40  No scrubbing the incision site   No lotion, ointment, powder or direct sunlight to the incision site for 2 weeks   No lifting more than 5lb or strenuous activity such as golfing, tennis or swimming for 6-8 weeks  No swimming pool, Jacuzzi for 6-8 weeks.   You should carry your ID card for metal detectors   Remove bandage after 24  Patient can shower 24 hours after procedure. Pat the area dry  Keep Cellular phone 6 inches away  from the device  Patient was instructed to call [phone] if the following occurs:      - fever with temperature > 101F      - swelling, drainage or bleeding at the site incision   - May d/c home from EP standpoint  - Continue care per primary team

## 2023-05-26 NOTE — PHYSICAL THERAPY INITIAL EVALUATION ADULT - PERTINENT HX OF CURRENT PROBLEM, REHAB EVAL
68 y/o old Female with a PMHx of DM, HTN, heart murmur, Gout, former smoker initially BIBEMS to  [facility] for respiratory distress. Subsequently became unresponsive and lost her pulse; CPR initiated, ROSC less than 2 minutes per chart; pt Intubated and extubated shortly thereafter.  Pt noted to have prolonged pauses on telemetry and was transferred to [facility] for PPM placement

## 2023-05-26 NOTE — PHYSICAL THERAPY INITIAL EVALUATION ADULT - RANGE OF MOTION EXAMINATION, REHAB EVAL
left shoulder flexion > 90 degrees not tested 2/2 PPM/bilateral upper extremity ROM was WFL (within functional limits)/bilateral lower extremity ROM was WFL (within functional limits)

## 2023-05-26 NOTE — PHYSICAL THERAPY INITIAL EVALUATION ADULT - GAIT DISTANCE, PT EVAL
60 feet ; pt c/o some lightheadedness with OOB and ambulating which subsided once seated in chair; O2 saturation 90% on room air during ambulation; increased to 94% with O2 n/c 2 L and rest, deep breathing

## 2023-05-26 NOTE — CHART NOTE - NSCHARTNOTEFT_GEN_A_CORE
67y Female s/p PPM insertion. Patient c/o pain at site. Patient denies any numbness, tingling, CP, or SOB.     Vital Signs Last 24 Hrs  T(C): 36.8 (05-26-23 @ 00:59), Max: 37.2 (05-25-23 @ 16:15)  T(F): 98.3 (05-26-23 @ 00:59), Max: 98.9 (05-25-23 @ 16:15)  HR: 74 (05-26-23 @ 00:59) (67 - 89)  BP: 121/54 (05-26-23 @ 00:59) (93/48 - 132/61)  BP(mean): 64 (05-26-23 @ 00:00) (53 - 78)  RR: 18 (05-26-23 @ 00:59) (17 - 25)  SpO2: 98% (05-26-23 @ 00:59) (97% - 100%)    PPM site: Dressing is clear/dry/intact. No overt hematoma or bleeding. Pulses palpable.    Chest Xray (prelim) - No PTX    Oxycodone 5mg PO x 1 dose ordered    Will continue to monitor.

## 2023-05-27 DIAGNOSIS — M79.631 PAIN IN RIGHT FOREARM: ICD-10-CM

## 2023-05-27 LAB
ANION GAP SERPL CALC-SCNC: 12 MMOL/L — SIGNIFICANT CHANGE UP (ref 7–14)
BASOPHILS # BLD AUTO: 0.09 K/UL — SIGNIFICANT CHANGE UP (ref 0–0.2)
BASOPHILS NFR BLD AUTO: 0.9 % — SIGNIFICANT CHANGE UP (ref 0–2)
BUN SERPL-MCNC: 20 MG/DL — SIGNIFICANT CHANGE UP (ref 7–23)
CALCIUM SERPL-MCNC: 9.1 MG/DL — SIGNIFICANT CHANGE UP (ref 8.4–10.5)
CHLORIDE SERPL-SCNC: 97 MMOL/L — LOW (ref 98–107)
CO2 SERPL-SCNC: 25 MMOL/L — SIGNIFICANT CHANGE UP (ref 22–31)
CREAT SERPL-MCNC: 0.7 MG/DL — SIGNIFICANT CHANGE UP (ref 0.5–1.3)
EGFR: 95 ML/MIN/1.73M2 — SIGNIFICANT CHANGE UP
EOSINOPHIL # BLD AUTO: 0.65 K/UL — HIGH (ref 0–0.5)
EOSINOPHIL NFR BLD AUTO: 6.2 % — HIGH (ref 0–6)
GIANT PLATELETS BLD QL SMEAR: PRESENT — SIGNIFICANT CHANGE UP
GLUCOSE BLDC GLUCOMTR-MCNC: 213 MG/DL — HIGH (ref 70–99)
GLUCOSE BLDC GLUCOMTR-MCNC: 215 MG/DL — HIGH (ref 70–99)
GLUCOSE BLDC GLUCOMTR-MCNC: 268 MG/DL — HIGH (ref 70–99)
GLUCOSE BLDC GLUCOMTR-MCNC: 272 MG/DL — HIGH (ref 70–99)
GLUCOSE SERPL-MCNC: 241 MG/DL — HIGH (ref 70–99)
HCT VFR BLD CALC: 31.1 % — LOW (ref 34.5–45)
HGB BLD-MCNC: 9.4 G/DL — LOW (ref 11.5–15.5)
IANC: 6.13 K/UL — SIGNIFICANT CHANGE UP (ref 1.8–7.4)
LYMPHOCYTES # BLD AUTO: 1.58 K/UL — SIGNIFICANT CHANGE UP (ref 1–3.3)
LYMPHOCYTES # BLD AUTO: 15 % — SIGNIFICANT CHANGE UP (ref 13–44)
MAGNESIUM SERPL-MCNC: 1.7 MG/DL — SIGNIFICANT CHANGE UP (ref 1.6–2.6)
MANUAL SMEAR VERIFICATION: SIGNIFICANT CHANGE UP
MCHC RBC-ENTMCNC: 21.1 PG — LOW (ref 27–34)
MCHC RBC-ENTMCNC: 30.2 GM/DL — LOW (ref 32–36)
MCV RBC AUTO: 69.7 FL — LOW (ref 80–100)
MONOCYTES # BLD AUTO: 0.28 K/UL — SIGNIFICANT CHANGE UP (ref 0–0.9)
MONOCYTES NFR BLD AUTO: 2.7 % — SIGNIFICANT CHANGE UP (ref 2–14)
NEUTROPHILS # BLD AUTO: 7.83 K/UL — HIGH (ref 1.8–7.4)
NEUTROPHILS NFR BLD AUTO: 74.3 % — SIGNIFICANT CHANGE UP (ref 43–77)
PHOSPHATE SERPL-MCNC: 3.1 MG/DL — SIGNIFICANT CHANGE UP (ref 2.5–4.5)
PLAT MORPH BLD: NORMAL — SIGNIFICANT CHANGE UP
PLATELET # BLD AUTO: 288 K/UL — SIGNIFICANT CHANGE UP (ref 150–400)
PLATELET COUNT - ESTIMATE: NORMAL — SIGNIFICANT CHANGE UP
POIKILOCYTOSIS BLD QL AUTO: SLIGHT — SIGNIFICANT CHANGE UP
POLYCHROMASIA BLD QL SMEAR: SLIGHT — SIGNIFICANT CHANGE UP
POTASSIUM SERPL-MCNC: 3.9 MMOL/L — SIGNIFICANT CHANGE UP (ref 3.5–5.3)
POTASSIUM SERPL-SCNC: 3.9 MMOL/L — SIGNIFICANT CHANGE UP (ref 3.5–5.3)
RBC # BLD: 4.46 M/UL — SIGNIFICANT CHANGE UP (ref 3.8–5.2)
RBC # FLD: 17.4 % — HIGH (ref 10.3–14.5)
RBC BLD AUTO: ABNORMAL
SODIUM SERPL-SCNC: 134 MMOL/L — LOW (ref 135–145)
TARGETS BLD QL SMEAR: SLIGHT — SIGNIFICANT CHANGE UP
VARIANT LYMPHS # BLD: 0.9 % — SIGNIFICANT CHANGE UP (ref 0–6)
WBC # BLD: 10.54 K/UL — HIGH (ref 3.8–10.5)
WBC # FLD AUTO: 10.54 K/UL — HIGH (ref 3.8–10.5)

## 2023-05-27 PROCEDURE — 93971 EXTREMITY STUDY: CPT | Mod: 26

## 2023-05-27 PROCEDURE — 99233 SBSQ HOSP IP/OBS HIGH 50: CPT

## 2023-05-27 RX ORDER — SODIUM CHLORIDE 9 MG/ML
4 INJECTION INTRAMUSCULAR; INTRAVENOUS; SUBCUTANEOUS EVERY 12 HOURS
Refills: 0 | Status: COMPLETED | OUTPATIENT
Start: 2023-05-27 | End: 2023-05-28

## 2023-05-27 RX ORDER — ENOXAPARIN SODIUM 100 MG/ML
40 INJECTION SUBCUTANEOUS EVERY 24 HOURS
Refills: 0 | Status: DISCONTINUED | OUTPATIENT
Start: 2023-05-27 | End: 2023-05-29

## 2023-05-27 RX ADMIN — Medication 6: at 12:40

## 2023-05-27 RX ADMIN — Medication 1: at 22:07

## 2023-05-27 RX ADMIN — SODIUM CHLORIDE 4 MILLILITER(S): 9 INJECTION INTRAMUSCULAR; INTRAVENOUS; SUBCUTANEOUS at 09:53

## 2023-05-27 RX ADMIN — OXYCODONE HYDROCHLORIDE 5 MILLIGRAM(S): 5 TABLET ORAL at 23:53

## 2023-05-27 RX ADMIN — LISINOPRIL 2.5 MILLIGRAM(S): 2.5 TABLET ORAL at 05:02

## 2023-05-27 RX ADMIN — Medication 100 MILLIGRAM(S): at 12:39

## 2023-05-27 RX ADMIN — SODIUM CHLORIDE 4 MILLILITER(S): 9 INJECTION INTRAMUSCULAR; INTRAVENOUS; SUBCUTANEOUS at 21:57

## 2023-05-27 RX ADMIN — Medication 100 MILLIGRAM(S): at 22:03

## 2023-05-27 RX ADMIN — OXYCODONE HYDROCHLORIDE 5 MILLIGRAM(S): 5 TABLET ORAL at 01:43

## 2023-05-27 RX ADMIN — CARVEDILOL PHOSPHATE 6.25 MILLIGRAM(S): 80 CAPSULE, EXTENDED RELEASE ORAL at 17:05

## 2023-05-27 RX ADMIN — Medication 4: at 17:04

## 2023-05-27 RX ADMIN — Medication 81 MILLIGRAM(S): at 12:39

## 2023-05-27 RX ADMIN — Medication 100 MILLIGRAM(S): at 05:01

## 2023-05-27 RX ADMIN — OXYCODONE HYDROCHLORIDE 5 MILLIGRAM(S): 5 TABLET ORAL at 01:07

## 2023-05-27 RX ADMIN — CARVEDILOL PHOSPHATE 6.25 MILLIGRAM(S): 80 CAPSULE, EXTENDED RELEASE ORAL at 05:01

## 2023-05-27 RX ADMIN — ENOXAPARIN SODIUM 40 MILLIGRAM(S): 100 INJECTION SUBCUTANEOUS at 12:39

## 2023-05-27 RX ADMIN — Medication 40 MILLIGRAM(S): at 05:01

## 2023-05-27 RX ADMIN — Medication 4: at 08:32

## 2023-05-27 NOTE — CHART NOTE - NSCHARTNOTEFT_GEN_A_CORE
Called by Vascular technician with preliminary result of RUE US, which showed no DVT but superficial thrombophlebitis. Will await full report. If there is appreciable thrombus, and if thrombus approaches a deep vein, will consider treating with anticoagulation given the pt's concomitant cardiovascular risk factors. Communicated this to the pt's  Usman over the phone.

## 2023-05-27 NOTE — PROGRESS NOTE ADULT - ASSESSMENT
Patient is a 67y old  Female with a PMH of DM, HTN, heart murmur, Gout, former smoker initially BIBEMS to  [facility] for respiratory distress. Subseq became unresponsive and lost her pulse; CPR initiated, ROSC less than 2 min per chart; pt Intubated and extubated shortly thereafter.  Pt noted to have prolonged pauses on tele and was tx to [facility] for PPM placement

## 2023-05-28 ENCOUNTER — TRANSCRIPTION ENCOUNTER (OUTPATIENT)
Age: 68
End: 2023-05-28

## 2023-05-28 LAB
ANION GAP SERPL CALC-SCNC: 13 MMOL/L — SIGNIFICANT CHANGE UP (ref 7–14)
BASOPHILS # BLD AUTO: 0.08 K/UL — SIGNIFICANT CHANGE UP (ref 0–0.2)
BASOPHILS NFR BLD AUTO: 0.8 % — SIGNIFICANT CHANGE UP (ref 0–2)
BUN SERPL-MCNC: 16 MG/DL — SIGNIFICANT CHANGE UP (ref 7–23)
CALCIUM SERPL-MCNC: 8.9 MG/DL — SIGNIFICANT CHANGE UP (ref 8.4–10.5)
CHLORIDE SERPL-SCNC: 100 MMOL/L — SIGNIFICANT CHANGE UP (ref 98–107)
CO2 SERPL-SCNC: 23 MMOL/L — SIGNIFICANT CHANGE UP (ref 22–31)
CREAT SERPL-MCNC: 0.64 MG/DL — SIGNIFICANT CHANGE UP (ref 0.5–1.3)
EGFR: 97 ML/MIN/1.73M2 — SIGNIFICANT CHANGE UP
EOSINOPHIL # BLD AUTO: 0.51 K/UL — HIGH (ref 0–0.5)
EOSINOPHIL NFR BLD AUTO: 5.2 % — SIGNIFICANT CHANGE UP (ref 0–6)
GLUCOSE BLDC GLUCOMTR-MCNC: 197 MG/DL — HIGH (ref 70–99)
GLUCOSE BLDC GLUCOMTR-MCNC: 210 MG/DL — HIGH (ref 70–99)
GLUCOSE BLDC GLUCOMTR-MCNC: 239 MG/DL — HIGH (ref 70–99)
GLUCOSE BLDC GLUCOMTR-MCNC: 273 MG/DL — HIGH (ref 70–99)
GLUCOSE SERPL-MCNC: 184 MG/DL — HIGH (ref 70–99)
HCT VFR BLD CALC: 29 % — LOW (ref 34.5–45)
HGB BLD-MCNC: 8.8 G/DL — LOW (ref 11.5–15.5)
IANC: 4.77 K/UL — SIGNIFICANT CHANGE UP (ref 1.8–7.4)
IMM GRANULOCYTES NFR BLD AUTO: 0.3 % — SIGNIFICANT CHANGE UP (ref 0–0.9)
LYMPHOCYTES # BLD AUTO: 3.45 K/UL — HIGH (ref 1–3.3)
LYMPHOCYTES # BLD AUTO: 35.3 % — SIGNIFICANT CHANGE UP (ref 13–44)
MAGNESIUM SERPL-MCNC: 1.9 MG/DL — SIGNIFICANT CHANGE UP (ref 1.6–2.6)
MCHC RBC-ENTMCNC: 21.2 PG — LOW (ref 27–34)
MCHC RBC-ENTMCNC: 30.3 GM/DL — LOW (ref 32–36)
MCV RBC AUTO: 69.7 FL — LOW (ref 80–100)
MONOCYTES # BLD AUTO: 0.93 K/UL — HIGH (ref 0–0.9)
MONOCYTES NFR BLD AUTO: 9.5 % — SIGNIFICANT CHANGE UP (ref 2–14)
NEUTROPHILS # BLD AUTO: 4.77 K/UL — SIGNIFICANT CHANGE UP (ref 1.8–7.4)
NEUTROPHILS NFR BLD AUTO: 48.9 % — SIGNIFICANT CHANGE UP (ref 43–77)
NRBC # BLD: 0 /100 WBCS — SIGNIFICANT CHANGE UP (ref 0–0)
NRBC # FLD: 0 K/UL — SIGNIFICANT CHANGE UP (ref 0–0)
PHOSPHATE SERPL-MCNC: 3.5 MG/DL — SIGNIFICANT CHANGE UP (ref 2.5–4.5)
PLATELET # BLD AUTO: 272 K/UL — SIGNIFICANT CHANGE UP (ref 150–400)
POTASSIUM SERPL-MCNC: 4.2 MMOL/L — SIGNIFICANT CHANGE UP (ref 3.5–5.3)
POTASSIUM SERPL-SCNC: 4.2 MMOL/L — SIGNIFICANT CHANGE UP (ref 3.5–5.3)
RBC # BLD: 4.16 M/UL — SIGNIFICANT CHANGE UP (ref 3.8–5.2)
RBC # FLD: 17.5 % — HIGH (ref 10.3–14.5)
SODIUM SERPL-SCNC: 136 MMOL/L — SIGNIFICANT CHANGE UP (ref 135–145)
WBC # BLD: 9.77 K/UL — SIGNIFICANT CHANGE UP (ref 3.8–10.5)
WBC # FLD AUTO: 9.77 K/UL — SIGNIFICANT CHANGE UP (ref 3.8–10.5)

## 2023-05-28 PROCEDURE — 99239 HOSP IP/OBS DSCHRG MGMT >30: CPT

## 2023-05-28 PROCEDURE — 93010 ELECTROCARDIOGRAM REPORT: CPT

## 2023-05-28 PROCEDURE — 93280 PM DEVICE PROGR EVAL DUAL: CPT | Mod: 26

## 2023-05-28 RX ORDER — ACETAMINOPHEN 500 MG
2 TABLET ORAL
Qty: 0 | Refills: 0 | DISCHARGE
Start: 2023-05-28

## 2023-05-28 RX ORDER — RIVAROXABAN 15 MG-20MG
1 KIT ORAL
Qty: 30 | Refills: 0
Start: 2023-05-28 | End: 2023-06-26

## 2023-05-28 RX ADMIN — Medication 100 MILLIGRAM(S): at 11:35

## 2023-05-28 RX ADMIN — Medication 2: at 17:59

## 2023-05-28 RX ADMIN — CARVEDILOL PHOSPHATE 6.25 MILLIGRAM(S): 80 CAPSULE, EXTENDED RELEASE ORAL at 17:59

## 2023-05-28 RX ADMIN — OXYCODONE HYDROCHLORIDE 5 MILLIGRAM(S): 5 TABLET ORAL at 01:27

## 2023-05-28 RX ADMIN — Medication 100 MILLIGRAM(S): at 05:59

## 2023-05-28 RX ADMIN — Medication 100 MILLIGRAM(S): at 21:54

## 2023-05-28 RX ADMIN — LISINOPRIL 2.5 MILLIGRAM(S): 2.5 TABLET ORAL at 05:59

## 2023-05-28 RX ADMIN — SODIUM CHLORIDE 4 MILLILITER(S): 9 INJECTION INTRAMUSCULAR; INTRAVENOUS; SUBCUTANEOUS at 10:44

## 2023-05-28 RX ADMIN — CARVEDILOL PHOSPHATE 6.25 MILLIGRAM(S): 80 CAPSULE, EXTENDED RELEASE ORAL at 06:00

## 2023-05-28 RX ADMIN — Medication 4: at 08:52

## 2023-05-28 RX ADMIN — Medication 6: at 12:48

## 2023-05-28 RX ADMIN — ENOXAPARIN SODIUM 40 MILLIGRAM(S): 100 INJECTION SUBCUTANEOUS at 11:35

## 2023-05-28 RX ADMIN — SODIUM CHLORIDE 4 MILLILITER(S): 9 INJECTION INTRAMUSCULAR; INTRAVENOUS; SUBCUTANEOUS at 21:56

## 2023-05-28 RX ADMIN — Medication 100 MILLIGRAM(S): at 14:08

## 2023-05-28 RX ADMIN — Medication 40 MILLIGRAM(S): at 06:00

## 2023-05-28 RX ADMIN — Medication 81 MILLIGRAM(S): at 11:35

## 2023-05-28 NOTE — PROVIDER CONTACT NOTE (CHANGE IN STATUS NOTIFICATION) - ACTION/TREATMENT ORDERED:
Heather Sue ACP aware, orthostatics ordered x1; orthostatic BPs (-) as per chart; arrived to bedside; will consult EPS for potential pacemaker interrogation.

## 2023-05-28 NOTE — DISCHARGE NOTE PROVIDER - CARE PROVIDER_API CALL
Elizabeth Arechiga  Cardiovascular Disease  [address] [place] [address]  Phone: [phone]  Fax: [phone]  Follow Up Time:     Martha Juan  Cardiac Electrophysiology  [address] [place] [address], [address]  [place] [address]  Phone: [phone]  Fax: [phone]  Follow Up Time:

## 2023-05-28 NOTE — DISCHARGE NOTE PROVIDER - NSDCFUSCHEDAPPT_GEN_ALL_CORE_FT
[facility] Physician [facility] [phone]  Scheduled Appointment: 06/07/2023

## 2023-05-28 NOTE — DISCHARGE NOTE NURSING/CASE MANAGEMENT/SOCIAL WORK - NSDCPNINST_GEN_ALL_CORE
Any chest pain, shortness of breath or palpitations please call your doctor or call 911. Continue to monitor pacemaker site for swelling, redness or fever. Follow up with all indicated appointments.

## 2023-05-28 NOTE — PROVIDER CONTACT NOTE (CHANGE IN STATUS NOTIFICATION) - BACKGROUND
pt admitted s/p ppm placement 5/25; transferred from [facility] 5/24, patient hypertensive & hypoxic; CPR ACLS completed; ROSC in 2 min

## 2023-05-28 NOTE — CHART NOTE - NSCHARTNOTEFT_GEN_A_CORE
Tele reviewed by Cardiology as pt was noted with pacing spikes after the QRS Complex, PPM interrogated, sensing threshold adjusted. Pt is stable for Discharge Home today. Tele reviewed by Cardiology as pt was noted with pacing spikes after the QRS Complex, PPM interrogated, sensing threshold adjusted. As Per their recommendations, Pt is stable for Discharge Home today.

## 2023-05-28 NOTE — DISCHARGE NOTE PROVIDER - NSDCCPCAREPLAN_GEN_ALL_CORE_FT
PRINCIPAL DISCHARGE DIAGNOSIS  Diagnosis: Cardiac arrhythmia  Assessment and Plan of Treatment: Pt was found with pauses   ·  Plan: s/p CPR at [facility]  s/p PPM for prolonged pauses and doing well  No strenuous activity x 3 weeks, No heavy lifting x 1 week, May shower but no bath x7 days, Monitor site for any bleeding, infection, redness, pain, swelling, Contact MD if any of these symptoms occur        SECONDARY DISCHARGE DIAGNOSES  Diagnosis: Pain and swelling of right forearm  Assessment and Plan of Treatment: s/p UE Duplex: No evidence of deep vein thrombosis in the right upper  extremity. Superficial vein  thrombosis noted within the proximal  right forearm basilic vein.  will treat with Xarelto 10 mg Daily x 30 days  and you will Need a repeat ultrasound  in 4 weeks     PRINCIPAL DISCHARGE DIAGNOSIS  Diagnosis: Cardiac arrhythmia  Assessment and Plan of Treatment: Pt was found with pauses   ·  Plan: s/p CPR at [facility]  s/p PPM for prolonged pauses and doing well  No strenuous activity x 3 weeks, No heavy lifting x 1 week, May shower but no bath x7 days, Monitor site for any bleeding, infection, redness, pain, swelling, Contact MD if any of these symptoms occur        SECONDARY DISCHARGE DIAGNOSES  Diagnosis: Acute on chronic heart failure with preserved ejection fraction (HFpEF)  Assessment and Plan of Treatment:   ·  Plan: s/p intubation at [place] likely due to ADHF  now comfortable on room air  can offer nebulizer prn; pt has scant phlegm and this seems to help her expectorate; suspect this will also continue to improve with incentive spirometry; observed and instructed pt technique at bedside.  Follow up with Your PMD in 1 week       Diagnosis: Pain and swelling of right forearm  Assessment and Plan of Treatment: s/p UE Duplex: No evidence of deep vein thrombosis in the right upper  extremity. Superficial vein  thrombosis noted within the proximal  right forearm basilic vein.  will treat with Xarelto 10 mg Daily x 30 days  and you will Need a repeat ultrasound  in 4 weeks

## 2023-05-28 NOTE — CHART NOTE - NSCHARTNOTEFT_GEN_A_CORE
RN notified that patient does not have a ride tonight and patient and  wants to speak to provider. Patient seen at bedside and had  via phone. Per patient, they were unaware that patient was being discharged until later and she does not have ride. Discussed the plan with  and patient and recommended patient can stay until her ride is here to  the patient. Answered all the questions the patient and  had.  also requests to speak to Dr. Juan and advised that they could speak to him possibly tomorrow and she has follow up appointments with him. Will monitor

## 2023-05-28 NOTE — DISCHARGE NOTE PROVIDER - HOSPITAL COURSE
Patient is a 67y old  Female with a PMH of DM, HTN, heart murmur, Gout, former smoker initially BIBEMS to  [facility] for respiratory distress. Subseq became unresponsive and lost her pulse; CPR initiated, ROSC less than 2 min per chart; pt Intubated and extubated shortly thereafter.  Pt noted to have prolonged pauses on tele and was tx to [facility] for PPM placement  Hospital Course   ·  Pain and swelling of right forearm.   s/p UE Duplex: No evidence of deep vein thrombosis in the right upper  extremity. Superficial vein  thrombosis noted within the proximal  right forearm basilic vein.  will treat with Xarelto 10 mg Daily x 30 days  and repeat ultrasound  in 4 weeks      ·  Cardiac arrhythmia.   ·  Plan: s/p CPR at [facility]  s/p PPM for prolonged pauses and doing well    ·  Acute hypoxemic respiratory failure.   ·  Plan: s/p intubation at [place] likely due to ADHF  now comfortable on room air  can offer nebulizer prn; pt has scant phlegm and this seems to help her expectorate; suspect this will also continue to improve with incentive spirometry; observed and instructed pt technique at bedside.    ·  Gout. cont allopurinol  no evidence of acute flare.    ·  Acute on chronic heart failure with preserved ejection fraction (HFpEF).   ·  Plan: cont coreg, ACE, lasix for now  currently euvolemic.    ·  Type 2 diabetes mellitus.   ·  Plan: takes metformin and ozempic at home  Case discussed with attending, Pt is stable for Discharge Home.

## 2023-05-28 NOTE — PROVIDER CONTACT NOTE (CHANGE IN STATUS NOTIFICATION) - SITUATION
Patient c/o of dizziness, vitals stable as per chart; RN noticed on tele monitor patient has pacing spikes prior to T-wave; appears NSR on monitor; RN concerned new pacemaker is undersensing or not sensing correctly

## 2023-05-28 NOTE — DISCHARGE NOTE PROVIDER - NSDCMRMEDTOKEN_GEN_ALL_CORE_FT
allopurinol 100 mg oral tablet: 1 tab(s) orally once a day  aspirin 81 mg oral tablet: 1 tab(s) orally once a day  Coreg 6.25 mg oral tablet: 1 tab(s) orally every 12 hours  furosemide 40 mg oral tablet: 1 tab(s) orally once a day  HumaLOG KwikPen 100 units/mL injectable solution: 18 unit(s) injectable 2 times a day  lisinopril 2.5 mg oral tablet: 1 tab(s) orally once a day  Ozempic 2 mg/1.5 mL (0.25 mg or 0.5 mg dose) subcutaneous solution: 25 milligram(s) subcutaneously once a week   acetaminophen 325 mg oral tablet: 2 tab(s) orally every 6 hours as needed for For pain  allopurinol 100 mg oral tablet: 1 tab(s) orally once a day  aspirin 81 mg oral tablet: 1 tab(s) orally once a day  Coreg 6.25 mg oral tablet: 1 tab(s) orally every 12 hours  furosemide 40 mg oral tablet: 1 tab(s) orally once a day  HumaLOG KwikPen 100 units/mL injectable solution: 18 unit(s) injectable 2 times a day  lisinopril 2.5 mg oral tablet: 1 tab(s) orally once a day  Ozempic 2 mg/1.5 mL (0.25 mg or 0.5 mg dose) subcutaneous solution: 25 milligram(s) subcutaneously once a week  Xarelto 10 mg oral tablet: 1 tab(s) orally once a day Stop after 30 Days

## 2023-05-28 NOTE — PROGRESS NOTE ADULT - ASSESSMENT
Patient is a 67y old  Female with a PMH of DM, HTN, heart murmur, Gout, former smoker initially BIBEMS to  [facility] for respiratory distress. Subseq became unresponsive and lost her pulse; CPR initiated, ROSC less than 2 min per chart; pt Intubated and extubated shortly thereafter.  Pt noted to have prolonged pauses on tele and was tx to [facility] for PPM placement

## 2023-05-28 NOTE — PROVIDER CONTACT NOTE (CHANGE IN STATUS NOTIFICATION) - RECOMMENDATIONS
stat ekg; orthostatics; consult eps for pacemaker interrogation prior to discharge? stat ekg; orthostatics; consult eps for pacemaker interrogation prior to discharge?; hold discharge

## 2023-05-28 NOTE — DISCHARGE NOTE NURSING/CASE MANAGEMENT/SOCIAL WORK - NSDCPEFALRISK_GEN_ALL_CORE
For information on Fall & Injury Prevention, visit: [email or web address] OR  [email or web address] OR  https://www.cdc.gov/steadi/patient.html

## 2023-05-28 NOTE — DISCHARGE NOTE NURSING/CASE MANAGEMENT/SOCIAL WORK - PATIENT PORTAL LINK FT
You can access the FollowMyHealth Patient Portal offered by [facility] by registering at the following website: [email or web address]. By joining [email or web address] FollowMyHealth portal, you will also be able to view your health information using other applications (apps) compatible with our system.

## 2023-05-28 NOTE — PROGRESS NOTE ADULT - ASSESSMENT
Patient is a 67y old  Female with a PMH of DM, HTN, heart murmur, Gout, former smoker initially BIBEMS to  [facility] for respiratory distress. Per chart review, Pt. was noted hypertensive and hpoxic in the 50% in the field and CPAP was placed. Upon arrival to the ED, pt. was found to be unresponsive and pulseless with initial rhythm of asystole. 1 round of ACLS with ROSC achieved < 2 mins. Patient was intubated and admitted for acute hypoxemic respiratory failure, subsuquently extubated. Patient was transferred to [facility] for pacemaker implant due to prolonged pause 5.2ses on MOCT(outpt. cardiac telemetry monitoring). Denies family hx of SCD. TTE on 5/23/23 reveals hyperdynamic LV function, LVEF 65%, severe LA volum index, mod. AS. TTE on 4/18 shows LVEF 55-60%, Mod MS, Mild AR, MR.   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  This plan has been reviewed with Dr. Martha Juan Patient is a 67y old  Female with a PMH of DM, HTN, heart murmur, Gout, former smoker initially BIBEMS to  [facility] for respiratory distress. Per chart review, Pt. was noted hypertensive and hpoxic in the 50% in the field and CPAP was placed. Upon arrival to the ED, pt. was found to be unresponsive and pulseless with initial rhythm of asystole. 1 round of ACLS with ROSC achieved < 2 mins. Patient was intubated and admitted for acute hypoxemic respiratory failure, subsuquently extubated. Patient was transferred to [facility] for pacemaker implant due to prolonged pause 5.2ses on MOCT(outpt. cardiac telemetry monitoring). Denies family hx of SCD. TTE on 5/23/23 reveals hyperdynamic LV function, LVEF 65%, severe LA volum index, mod. AS. TTE on 4/18 shows LVEF 55-60%, Mod MS, Mild AR, MR.   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   This plan has been reviewed with Dr. Martha Juan

## 2023-05-28 NOTE — PROCEDURE NOTE - ADDITIONAL PROCEDURE DETAILS
Sensing on the V lead changed to unipolar  Current 9.2mV Sensing on the V lead changed to unipolar due to undersensing in bipolar mode  Current R wave measurement  = 9.2mV

## 2023-05-29 VITALS
SYSTOLIC BLOOD PRESSURE: 110 MMHG | RESPIRATION RATE: 18 BRPM | HEART RATE: 65 BPM | DIASTOLIC BLOOD PRESSURE: 52 MMHG | OXYGEN SATURATION: 98 % | TEMPERATURE: 98 F

## 2023-05-29 LAB
ANION GAP SERPL CALC-SCNC: 11 MMOL/L — SIGNIFICANT CHANGE UP (ref 7–14)
BASOPHILS # BLD AUTO: 0.09 K/UL — SIGNIFICANT CHANGE UP (ref 0–0.2)
BASOPHILS NFR BLD AUTO: 0.9 % — SIGNIFICANT CHANGE UP (ref 0–2)
BUN SERPL-MCNC: 17 MG/DL — SIGNIFICANT CHANGE UP (ref 7–23)
CALCIUM SERPL-MCNC: 9 MG/DL — SIGNIFICANT CHANGE UP (ref 8.4–10.5)
CHLORIDE SERPL-SCNC: 100 MMOL/L — SIGNIFICANT CHANGE UP (ref 98–107)
CO2 SERPL-SCNC: 24 MMOL/L — SIGNIFICANT CHANGE UP (ref 22–31)
CREAT SERPL-MCNC: 0.81 MG/DL — SIGNIFICANT CHANGE UP (ref 0.5–1.3)
EGFR: 80 ML/MIN/1.73M2 — SIGNIFICANT CHANGE UP
EOSINOPHIL # BLD AUTO: 0.6 K/UL — HIGH (ref 0–0.5)
EOSINOPHIL NFR BLD AUTO: 6.3 % — HIGH (ref 0–6)
GLUCOSE BLDC GLUCOMTR-MCNC: 233 MG/DL — HIGH (ref 70–99)
GLUCOSE BLDC GLUCOMTR-MCNC: 275 MG/DL — HIGH (ref 70–99)
GLUCOSE SERPL-MCNC: 221 MG/DL — HIGH (ref 70–99)
HCT VFR BLD CALC: 28.9 % — LOW (ref 34.5–45)
HGB BLD-MCNC: 8.8 G/DL — LOW (ref 11.5–15.5)
IANC: 4.76 K/UL — SIGNIFICANT CHANGE UP (ref 1.8–7.4)
IMM GRANULOCYTES NFR BLD AUTO: 0.6 % — SIGNIFICANT CHANGE UP (ref 0–0.9)
LYMPHOCYTES # BLD AUTO: 3.24 K/UL — SIGNIFICANT CHANGE UP (ref 1–3.3)
LYMPHOCYTES # BLD AUTO: 33.8 % — SIGNIFICANT CHANGE UP (ref 13–44)
MAGNESIUM SERPL-MCNC: 1.8 MG/DL — SIGNIFICANT CHANGE UP (ref 1.6–2.6)
MCHC RBC-ENTMCNC: 21.2 PG — LOW (ref 27–34)
MCHC RBC-ENTMCNC: 30.4 GM/DL — LOW (ref 32–36)
MCV RBC AUTO: 69.5 FL — LOW (ref 80–100)
MONOCYTES # BLD AUTO: 0.83 K/UL — SIGNIFICANT CHANGE UP (ref 0–0.9)
MONOCYTES NFR BLD AUTO: 8.7 % — SIGNIFICANT CHANGE UP (ref 2–14)
NEUTROPHILS # BLD AUTO: 4.76 K/UL — SIGNIFICANT CHANGE UP (ref 1.8–7.4)
NEUTROPHILS NFR BLD AUTO: 49.7 % — SIGNIFICANT CHANGE UP (ref 43–77)
NRBC # BLD: 0 /100 WBCS — SIGNIFICANT CHANGE UP (ref 0–0)
NRBC # FLD: 0 K/UL — SIGNIFICANT CHANGE UP (ref 0–0)
PHOSPHATE SERPL-MCNC: 3.5 MG/DL — SIGNIFICANT CHANGE UP (ref 2.5–4.5)
PLATELET # BLD AUTO: 271 K/UL — SIGNIFICANT CHANGE UP (ref 150–400)
POTASSIUM SERPL-MCNC: 4.1 MMOL/L — SIGNIFICANT CHANGE UP (ref 3.5–5.3)
POTASSIUM SERPL-SCNC: 4.1 MMOL/L — SIGNIFICANT CHANGE UP (ref 3.5–5.3)
RBC # BLD: 4.16 M/UL — SIGNIFICANT CHANGE UP (ref 3.8–5.2)
RBC # FLD: 17.7 % — HIGH (ref 10.3–14.5)
SODIUM SERPL-SCNC: 135 MMOL/L — SIGNIFICANT CHANGE UP (ref 135–145)
WBC # BLD: 9.58 K/UL — SIGNIFICANT CHANGE UP (ref 3.8–10.5)
WBC # FLD AUTO: 9.58 K/UL — SIGNIFICANT CHANGE UP (ref 3.8–10.5)

## 2023-05-29 PROCEDURE — 99232 SBSQ HOSP IP/OBS MODERATE 35: CPT

## 2023-05-29 RX ADMIN — Medication 650 MILLIGRAM(S): at 01:41

## 2023-05-29 RX ADMIN — Medication 6: at 12:20

## 2023-05-29 RX ADMIN — Medication 81 MILLIGRAM(S): at 12:03

## 2023-05-29 RX ADMIN — Medication 40 MILLIGRAM(S): at 06:05

## 2023-05-29 RX ADMIN — LISINOPRIL 2.5 MILLIGRAM(S): 2.5 TABLET ORAL at 06:06

## 2023-05-29 RX ADMIN — Medication 100 MILLIGRAM(S): at 06:06

## 2023-05-29 RX ADMIN — ENOXAPARIN SODIUM 40 MILLIGRAM(S): 100 INJECTION SUBCUTANEOUS at 12:03

## 2023-05-29 RX ADMIN — Medication 4: at 08:29

## 2023-05-29 RX ADMIN — Medication 650 MILLIGRAM(S): at 02:11

## 2023-05-29 RX ADMIN — Medication 100 MILLIGRAM(S): at 12:03

## 2023-05-29 RX ADMIN — CARVEDILOL PHOSPHATE 6.25 MILLIGRAM(S): 80 CAPSULE, EXTENDED RELEASE ORAL at 06:05

## 2023-05-29 NOTE — PROGRESS NOTE ADULT - PROBLEM SELECTOR PROBLEM 5
Type 2 diabetes mellitus
Acute on chronic heart failure with preserved ejection fraction (HFpEF)
Acute on chronic heart failure with preserved ejection fraction (HFpEF)
Type 2 diabetes mellitus
Acute on chronic heart failure with preserved ejection fraction (HFpEF)

## 2023-05-29 NOTE — PROGRESS NOTE ADULT - SUBJECTIVE AND OBJECTIVE BOX
Patient is a 67y old  Female who presents with a chief complaint of     SUBJECTIVE / OVERNIGHT EVENTS: Pt generally feeling well. Some residual throat pain which she attributes to being intubated recently.    MEDICATIONS  (STANDING):  allopurinol 100 milliGRAM(s) Oral daily  aspirin enteric coated 81 milliGRAM(s) Oral daily  carvedilol 6.25 milliGRAM(s) Oral every 12 hours  dextrose 5%. 1000 milliLiter(s) (50 mL/Hr) IV Continuous <Continuous>  dextrose 5%. 1000 milliLiter(s) (100 mL/Hr) IV Continuous <Continuous>  dextrose 50% Injectable 25 Gram(s) IV Push once  dextrose 50% Injectable 12.5 Gram(s) IV Push once  dextrose 50% Injectable 25 Gram(s) IV Push once  furosemide    Tablet 40 milliGRAM(s) Oral daily  glucagon  Injectable 1 milliGRAM(s) IntraMuscular once  insulin lispro (ADMELOG) corrective regimen sliding scale   SubCutaneous at bedtime  insulin lispro (ADMELOG) corrective regimen sliding scale   SubCutaneous three times a day before meals  lisinopril 2.5 milliGRAM(s) Oral daily    MEDICATIONS  (PRN):  dextrose Oral Gel 15 Gram(s) Oral once PRN Blood Glucose LESS THAN 70 milliGRAM(s)/deciliter      CAPILLARY BLOOD GLUCOSE      POCT Blood Glucose.: 202 mg/dL (25 May 2023 08:31)  POCT Blood Glucose.: 191 mg/dL (24 May 2023 21:17)  POCT Blood Glucose.: 207 mg/dL (24 May 2023 17:22)  POCT Blood Glucose.: 187 mg/dL (24 May 2023 12:20)    I&O's Summary    24 May 2023 07:01  -  25 May 2023 07:00  --------------------------------------------------------  IN: 200 mL / OUT: 750 mL / NET: -550 mL        PHYSICAL EXAM:  Vital Signs Last 24 Hrs  T(C): 36.8 (25 May 2023 09:05), Max: 37.4 (24 May 2023 16:50)  T(F): 98.3 (25 May 2023 09:05), Max: 99.4 (24 May 2023 16:50)  HR: 67 (25 May 2023 09:05) (67 - 92)  BP: 123/50 (25 May 2023 09:05) (123/50 - 157/73)  BP(mean): 90 (24 May 2023 14:30) (90 - 90)  RR: 18 (25 May 2023 09:05) (18 - 18)  SpO2: 98% (25 May 2023 09:05) (98% - 100%)    Parameters below as of 25 May 2023 09:05  Patient On (Oxygen Delivery Method): nasal cannula  O2 Flow (L/min): 3    CONSTITUTIONAL: obese, NAD, well-developed, well-groomed  EYES: PERRLA; conjunctiva and sclera clear  ENMT: Moist oral mucosa, no pharyngeal injection or exudates; normal dentition  NECK: Supple, no palpable masses; no thyromegaly  RESPIRATORY: Normal respiratory effort; lungs are clear to auscultation bilaterally  CARDIOVASCULAR: Regular rate and rhythm, normal S1 and S2, no murmur/rub/gallop; No lower extremity edema; Peripheral pulses are 2+ bilaterally  ABDOMEN: Nontender to palpation, normoactive bowel sounds, no rebound/guarding; No hepatosplenomegaly  MUSCULOSKELETAL:  No clubbing or cyanosis of digits; no joint swelling or tenderness to palpation  PSYCH: A+O to person, place, and time; affect appropriate  NEUROLOGY: CN 2-12 are intact and symmetric; no gross sensory deficits   SKIN: No rashes; no palpable lesions    LABS:                        9.7    10.98 )-----------( 295      ( 25 May 2023 07:04 )             31.4     05-25    136  |  99  |  15  ----------------------------<  193<H>  4.3   |  26  |  0.69    Ca    9.5      25 May 2023 07:04  Phos  3.4     05-25  Mg     1.80     05-25    TPro  7.5  /  Alb  3.6  /  TBili  0.4  /  DBili  x   /  AST  20  /  ALT  20  /  AlkPhos  83  05-24                RADIOLOGY & ADDITIONAL TESTS:  Results Reviewed:   Imaging Personally Reviewed:  Electrocardiogram Personally Reviewed:    COORDINATION OF CARE:  Care Discussed with Consultants/Other Providers [Y/N]:  Prior or Outpatient Records Reviewed [Y/N]:  
Patient is seen and examined. Denies any chest pain, SOB, palpitations or dizziness. s/p PPM yesterday    PAST MEDICAL & SURGICAL HISTORY:  DM (diabetes mellitus)    HTN (hypertension)    S/P cholecystectomy        MEDICATIONS  (STANDING):  allopurinol 100 milliGRAM(s) Oral daily  aspirin enteric coated 81 milliGRAM(s) Oral daily  carvedilol 6.25 milliGRAM(s) Oral every 12 hours  dextrose 5%. 1000 milliLiter(s) (100 mL/Hr) IV Continuous <Continuous>  dextrose 5%. 1000 milliLiter(s) (50 mL/Hr) IV Continuous <Continuous>  dextrose 50% Injectable 25 Gram(s) IV Push once  dextrose 50% Injectable 12.5 Gram(s) IV Push once  dextrose 50% Injectable 25 Gram(s) IV Push once  furosemide    Tablet 40 milliGRAM(s) Oral daily  glucagon  Injectable 1 milliGRAM(s) IntraMuscular once  insulin lispro (ADMELOG) corrective regimen sliding scale   SubCutaneous at bedtime  insulin lispro (ADMELOG) corrective regimen sliding scale   SubCutaneous three times a day before meals  lisinopril 2.5 milliGRAM(s) Oral daily    MEDICATIONS  (PRN):  acetaminophen     Tablet .. 650 milliGRAM(s) Oral every 6 hours PRN Temp greater or equal to 38C (100.4F), Mild Pain (1 - 3), Moderate Pain (4 - 6)  dextrose Oral Gel 15 Gram(s) Oral once PRN Blood Glucose LESS THAN 70 milliGRAM(s)/deciliter  oxyCODONE    IR 5 milliGRAM(s) Oral every 8 hours PRN Severe Pain (7 - 10)      Vital Signs Last 24 Hrs  T(C): 36.2 (26 May 2023 08:41), Max: 37.2 (25 May 2023 16:15)  T(F): 97.1 (26 May 2023 08:41), Max: 98.9 (25 May 2023 16:15)  HR: 61 (26 May 2023 08:41) (61 - 89)  BP: 110/47 (26 May 2023 08:41) (93/48 - 132/61)  BP(mean): 64 (26 May 2023 00:00) (53 - 78)  RR: 18 (26 May 2023 08:41) (17 - 25)  SpO2: 100% (26 May 2023 08:41) (97% - 100%)    Parameters below as of 26 May 2023 08:41  Patient On (Oxygen Delivery Method): nasal cannula  O2 Flow (L/min): 2    INTERPRETATION OF TELEMETRY: A pacing @ 60s-70s    LABS:                        9.6    8.92  )-----------( 285      ( 26 May 2023 06:25 )             31.2     05-26    135  |  98  |  16  ----------------------------<  218<H>  3.9   |  25  |  0.72    Ca    9.1      26 May 2023 06:25  Phos  3.5     05-26  Mg     1.90     05-26    TPro  7.4  /  Alb  3.5  /  TBili  0.4  /  DBili  x   /  AST  20  /  ALT  14  /  AlkPhos  77  05-26    I&O's Summary    25 May 2023 07:01  -  26 May 2023 07:00  --------------------------------------------------------  IN: 350 mL / OUT: 300 mL / NET: 50 mL    26 May 2023 07:01  -  26 May 2023 12:51  --------------------------------------------------------  IN: 570 mL / OUT: 600 mL / NET: -30 mL    PHYSICAL EXAM:    GENERAL: In no apparent distress, well nourished, and hydrated.  HEART: Regular rate and rhythm; No murmurs, rubs, or gallops.  PULMONARY: Clear to auscultation and percussion.  No rales, wheezing, or rhonchi bilaterally.  ABDOMEN: Soft, Nontender, Nondistended; Bowel sounds present  EXTREMITIES:  2+ Peripheral Pulses, No clubbing, cyanosis, or edema          
    Patient is a 67y old  Female who presents with a chief complaint of     SUBJECTIVE / OVERNIGHT EVENTS: S/p pacemaker placement. Had some chest wall pain at procedure site overnight, improved with oxycodone 5mg x 1. Currently fairly comfortable but still a little sore.    MEDICATIONS  (STANDING):  allopurinol 100 milliGRAM(s) Oral daily  aspirin enteric coated 81 milliGRAM(s) Oral daily  carvedilol 6.25 milliGRAM(s) Oral every 12 hours  dextrose 5%. 1000 milliLiter(s) (100 mL/Hr) IV Continuous <Continuous>  dextrose 5%. 1000 milliLiter(s) (50 mL/Hr) IV Continuous <Continuous>  dextrose 50% Injectable 25 Gram(s) IV Push once  dextrose 50% Injectable 12.5 Gram(s) IV Push once  dextrose 50% Injectable 25 Gram(s) IV Push once  furosemide    Tablet 40 milliGRAM(s) Oral daily  glucagon  Injectable 1 milliGRAM(s) IntraMuscular once  insulin lispro (ADMELOG) corrective regimen sliding scale   SubCutaneous at bedtime  insulin lispro (ADMELOG) corrective regimen sliding scale   SubCutaneous three times a day before meals  lisinopril 2.5 milliGRAM(s) Oral daily    MEDICATIONS  (PRN):  dextrose Oral Gel 15 Gram(s) Oral once PRN Blood Glucose LESS THAN 70 milliGRAM(s)/deciliter      CAPILLARY BLOOD GLUCOSE      POCT Blood Glucose.: 211 mg/dL (26 May 2023 08:21)  POCT Blood Glucose.: 229 mg/dL (26 May 2023 01:07)  POCT Blood Glucose.: 176 mg/dL (25 May 2023 22:40)  POCT Blood Glucose.: 138 mg/dL (25 May 2023 18:53)  POCT Blood Glucose.: 174 mg/dL (25 May 2023 16:50)  POCT Blood Glucose.: 195 mg/dL (25 May 2023 12:32)    I&O's Summary    25 May 2023 07:01  -  26 May 2023 07:00  --------------------------------------------------------  IN: 350 mL / OUT: 300 mL / NET: 50 mL    26 May 2023 07:01  -  26 May 2023 11:57  --------------------------------------------------------  IN: 570 mL / OUT: 600 mL / NET: -30 mL        PHYSICAL EXAM:  Vital Signs Last 24 Hrs  T(C): 36.2 (26 May 2023 08:41), Max: 37.2 (25 May 2023 16:15)  T(F): 97.1 (26 May 2023 08:41), Max: 98.9 (25 May 2023 16:15)  HR: 61 (26 May 2023 08:41) (61 - 89)  BP: 110/47 (26 May 2023 08:41) (93/48 - 132/61)  BP(mean): 64 (26 May 2023 00:00) (53 - 78)  RR: 18 (26 May 2023 08:41) (17 - 25)  SpO2: 100% (26 May 2023 08:41) (97% - 100%)    Parameters below as of 26 May 2023 08:41  Patient On (Oxygen Delivery Method): nasal cannula  O2 Flow (L/min): 2    CONSTITUTIONAL: NAD, well-developed, well-groomed  EYES: PERRLA; conjunctiva and sclera clear  ENMT: Moist oral mucosa, no pharyngeal injection or exudates; normal dentition  NECK: Supple, no palpable masses; no thyromegaly  RESPIRATORY: Normal respiratory effort; lungs are clear to auscultation bilaterally  CARDIOVASCULAR: Regular rate and rhythm, normal S1 and S2, no murmur/rub/gallop; No lower extremity edema; Peripheral pulses are 2+ bilaterally  ABDOMEN: Nontender to palpation, normoactive bowel sounds, no rebound/guarding; No hepatosplenomegaly  MUSCULOSKELETAL:  No clubbing or cyanosis of digits; no joint swelling or tenderness to palpation  PSYCH: A+O to person, place, and time; affect appropriate  NEUROLOGY: CN 2-12 are intact and symmetric; no gross sensory deficits   SKIN: No rashes; no palpable lesions    LABS:                        9.6    8.92  )-----------( 285      ( 26 May 2023 06:25 )             31.2     05-26    135  |  98  |  16  ----------------------------<  218<H>  3.9   |  25  |  0.72    Ca    9.1      26 May 2023 06:25  Phos  3.5     05-26  Mg     1.90     05-26    TPro  7.4  /  Alb  3.5  /  TBili  0.4  /  DBili  x   /  AST  20  /  ALT  14  /  AlkPhos  77  05-26                RADIOLOGY & ADDITIONAL TESTS:  Results Reviewed:   Imaging Personally Reviewed:  Electrocardiogram Personally Reviewed:    COORDINATION OF CARE:  Care Discussed with Consultants/Other Providers [Y/N]:  Prior or Outpatient Records Reviewed [Y/N]:  
interval history:  no acute overnight event  Tele reveals SR  NPO for anticipated PPM     PAST MEDICAL & SURGICAL HISTORY:  DM (diabetes mellitus)    HTN (hypertension)    S/P cholecystectomy        MEDICATIONS  (STANDING):  allopurinol 100 milliGRAM(s) Oral daily  aspirin enteric coated 81 milliGRAM(s) Oral daily  carvedilol 6.25 milliGRAM(s) Oral every 12 hours  dextrose 5%. 1000 milliLiter(s) (50 mL/Hr) IV Continuous <Continuous>  dextrose 5%. 1000 milliLiter(s) (100 mL/Hr) IV Continuous <Continuous>  dextrose 50% Injectable 25 Gram(s) IV Push once  dextrose 50% Injectable 12.5 Gram(s) IV Push once  dextrose 50% Injectable 25 Gram(s) IV Push once  furosemide    Tablet 40 milliGRAM(s) Oral daily  glucagon  Injectable 1 milliGRAM(s) IntraMuscular once  insulin lispro (ADMELOG) corrective regimen sliding scale   SubCutaneous at bedtime  insulin lispro (ADMELOG) corrective regimen sliding scale   SubCutaneous three times a day before meals  lisinopril 2.5 milliGRAM(s) Oral daily    MEDICATIONS  (PRN):  dextrose Oral Gel 15 Gram(s) Oral once PRN Blood Glucose LESS THAN 70 milliGRAM(s)/deciliter            Vital Signs Last 24 Hrs  T(C): 36.9 (25 May 2023 05:15), Max: 37.4 (24 May 2023 16:50)  T(F): 98.5 (25 May 2023 05:15), Max: 99.4 (24 May 2023 16:50)  HR: 92 (25 May 2023 00:21) (79 - 92)  BP: 132/53 (25 May 2023 05:15) (130/58 - 157/73)  BP(mean): 90 (24 May 2023 14:30) (90 - 90)  RR: 18 (25 May 2023 05:15) (18 - 18)  SpO2: 100% (25 May 2023 05:15) (100% - 100%)    Parameters below as of 25 May 2023 05:15  Patient On (Oxygen Delivery Method): nasal cannula                INTERPRETATION OF TELEMETRY: SR at 70s-80s    ECG:        LABS:                        9.7    10.98 )-----------( 295      ( 25 May 2023 07:04 )             31.4     05-25    136  |  99  |  15  ----------------------------<  193<H>  4.3   |  26  |  0.69    Ca    9.5      25 May 2023 07:04  Phos  3.4     05-25  Mg     1.80     05-25    TPro  7.5  /  Alb  3.6  /  TBili  0.4  /  DBili  x   /  AST  20  /  ALT  20  /  AlkPhos  83  05-24            I&O's Summary    24 May 2023 07:01  -  25 May 2023 07:00  --------------------------------------------------------  IN: 200 mL / OUT: 750 mL / NET: -550 mL      BNP  RADIOLOGY & ADDITIONAL STUDIES:      PHYSICAL EXAM:    GENERAL: In no apparent distress, well nourished, and hydrated.  HEART: Regular rate and rhythm; No murmurs, rubs, or gallops.  PULMONARY: Clear to auscultation and percussion.  No rales, wheezing, or rhonchi bilaterally.  ABDOMEN: Soft, Nontender, Nondistended; Bowel sounds present  EXTREMITIES:  2+ Peripheral Pulses, No clubbing, cyanosis, or edema  NEUROLOGICAL: Grossly nonfocal        
FOLLOW UP:  SENA SCI PPM  SUBJECTIVE/OBSERVATIONS:  Patient seen and examined. She complains of cough, no reports of chest pain    INTERVAL EVENTS:  Pacemaker spikes on T wave      Vital Signs Last 24 Hrs  T(C): 37 (28 May 2023 17:27), Max: 37.1 (27 May 2023 22:52)  T(F): 98.6 (28 May 2023 17:27), Max: 98.7 (27 May 2023 22:52)  HR: 68 (28 May 2023 17:27) (64 - 81)  BP: 130/60 (28 May 2023 17:27) (106/45 - 130/60)  BP(mean): --  RR: 19 (28 May 2023 17:27) (18 - 19)  SpO2: 100% (28 May 2023 17:27) (91% - 100%)    Parameters below as of 28 May 2023 17:27  Patient On (Oxygen Delivery Method): room air      I&O's Summary    27 May 2023 07:01  -  28 May 2023 07:00  --------------------------------------------------------  IN: 0 mL / OUT: 550 mL / NET: -550 mL    28 May 2023 07:01  -  28 May 2023 17:40  --------------------------------------------------------  IN: 600 mL / OUT: 0 mL / NET: 600 mL        MEDICATIONS:  aspirin enteric coated 81 milliGRAM(s) Oral daily  carvedilol 6.25 milliGRAM(s) Oral every 12 hours  enoxaparin Injectable 40 milliGRAM(s) SubCutaneous every 24 hours  furosemide    Tablet 40 milliGRAM(s) Oral daily  lisinopril 2.5 milliGRAM(s) Oral daily      benzonatate 100 milliGRAM(s) Oral every 8 hours  guaiFENesin  milliGRAM(s) Oral every 12 hours PRN  guaiFENesin Oral Liquid (Sugar-Free) 200 milliGRAM(s) Oral every 6 hours PRN  sodium chloride 3%  Inhalation 4 milliLiter(s) Inhalation every 12 hours    acetaminophen     Tablet .. 650 milliGRAM(s) Oral every 6 hours PRN  oxyCODONE    IR 5 milliGRAM(s) Oral every 8 hours PRN      allopurinol 100 milliGRAM(s) Oral daily  dextrose 50% Injectable 25 Gram(s) IV Push once  dextrose 50% Injectable 25 Gram(s) IV Push once  dextrose 50% Injectable 12.5 Gram(s) IV Push once  dextrose Oral Gel 15 Gram(s) Oral once PRN  glucagon  Injectable 1 milliGRAM(s) IntraMuscular once  insulin lispro (ADMELOG) corrective regimen sliding scale   SubCutaneous three times a day before meals  insulin lispro (ADMELOG) corrective regimen sliding scale   SubCutaneous at bedtime    dextrose 5%. 1000 milliLiter(s) IV Continuous <Continuous>  dextrose 5%. 1000 milliLiter(s) IV Continuous <Continuous>      REVIEW OF SYSTEMS:  Complete 10point ROS negative.    PHYSICAL EXAM:  General: NAD  Cardiovascular: Normal S1 S2, No JVD, No murmurs, No edema  Respiratory: Lungs clear to auscultation	  Gastrointestinal:  Soft, Non-tender, + BS	  Skin: warm and dry, No rashes, No ecchymoses, No cyanosis	  Extremities: Normal range of motion, No clubbing, cyanosis or edema  Vascular: Peripheral pulses palpable 2+ bilaterally    LABS:	 	    CBC Full  -  ( 28 May 2023 05:44 )  WBC Count : 9.77 K/uL  Hemoglobin : 8.8 g/dL  Hematocrit : 29.0 %  Platelet Count - Automated : 272 K/uL  Mean Cell Volume : 69.7 fL  Mean Cell Hemoglobin : 21.2 pg  Mean Cell Hemoglobin Concentration : 30.3 gm/dL  Auto Neutrophil # : 4.77 K/uL  Auto Lymphocyte # : 3.45 K/uL  Auto Monocyte # : 0.93 K/uL  Auto Eosinophil # : 0.51 K/uL  Auto Basophil # : 0.08 K/uL  Auto Neutrophil % : 48.9 %  Auto Lymphocyte % : 35.3 %  Auto Monocyte % : 9.5 %  Auto Eosinophil % : 5.2 %  Auto Basophil % : 0.8 %    05-28    136  |  100  |  16  ----------------------------<  184<H>  4.2   |  23  |  0.64  05-27    134<L>  |  97<L>  |  20  ----------------------------<  241<H>  3.9   |  25  |  0.70    Ca    8.9      28 May 2023 05:44  Ca    9.1      27 May 2023 06:04  Phos  3.5     05-28  Phos  3.1     05-27  Mg     1.90     05-28  Mg     1.70     05-27      
    Patient is a 67y old  Female who presents with a chief complaint of Respiratory distress (26 May 2023 09:46)      SUBJECTIVE / OVERNIGHT EVENTS: No overnight event. Pt feels well without complaint. Denies SOB currently. Right forearm SVT is not currently painful.    MEDICATIONS  (STANDING):  allopurinol 100 milliGRAM(s) Oral daily  aspirin enteric coated 81 milliGRAM(s) Oral daily  benzonatate 100 milliGRAM(s) Oral every 8 hours  carvedilol 6.25 milliGRAM(s) Oral every 12 hours  dextrose 5%. 1000 milliLiter(s) (50 mL/Hr) IV Continuous <Continuous>  dextrose 5%. 1000 milliLiter(s) (100 mL/Hr) IV Continuous <Continuous>  dextrose 50% Injectable 25 Gram(s) IV Push once  dextrose 50% Injectable 25 Gram(s) IV Push once  dextrose 50% Injectable 12.5 Gram(s) IV Push once  enoxaparin Injectable 40 milliGRAM(s) SubCutaneous every 24 hours  furosemide    Tablet 40 milliGRAM(s) Oral daily  glucagon  Injectable 1 milliGRAM(s) IntraMuscular once  insulin lispro (ADMELOG) corrective regimen sliding scale   SubCutaneous three times a day before meals  insulin lispro (ADMELOG) corrective regimen sliding scale   SubCutaneous at bedtime  lisinopril 2.5 milliGRAM(s) Oral daily  sodium chloride 3%  Inhalation 4 milliLiter(s) Inhalation every 12 hours    MEDICATIONS  (PRN):  acetaminophen     Tablet .. 650 milliGRAM(s) Oral every 6 hours PRN Temp greater or equal to 38C (100.4F), Mild Pain (1 - 3), Moderate Pain (4 - 6)  dextrose Oral Gel 15 Gram(s) Oral once PRN Blood Glucose LESS THAN 70 milliGRAM(s)/deciliter  guaiFENesin  milliGRAM(s) Oral every 12 hours PRN Cough  guaiFENesin Oral Liquid (Sugar-Free) 200 milliGRAM(s) Oral every 6 hours PRN Cough  oxyCODONE    IR 5 milliGRAM(s) Oral every 8 hours PRN Severe Pain (7 - 10)      CAPILLARY BLOOD GLUCOSE      POCT Blood Glucose.: 273 mg/dL (28 May 2023 12:45)  POCT Blood Glucose.: 210 mg/dL (28 May 2023 08:46)  POCT Blood Glucose.: 272 mg/dL (27 May 2023 21:24)  POCT Blood Glucose.: 213 mg/dL (27 May 2023 17:03)    I&O's Summary    27 May 2023 07:01  -  28 May 2023 07:00  --------------------------------------------------------  IN: 0 mL / OUT: 550 mL / NET: -550 mL    28 May 2023 07:01  -  28 May 2023 12:50  --------------------------------------------------------  IN: 600 mL / OUT: 0 mL / NET: 600 mL        PHYSICAL EXAM:  Vital Signs Last 24 Hrs  T(C): 37.1 (28 May 2023 12:16), Max: 37.1 (27 May 2023 22:52)  T(F): 98.7 (28 May 2023 12:16), Max: 98.7 (27 May 2023 22:52)  HR: 64 (28 May 2023 12:16) (64 - 87)  BP: 108/47 (28 May 2023 12:16) (106/45 - 151/75)  BP(mean): --  RR: 18 (28 May 2023 12:16) (18 - 18)  SpO2: 98% (28 May 2023 12:16) (91% - 100%)    Parameters below as of 28 May 2023 12:16  Patient On (Oxygen Delivery Method): room air      CONSTITUTIONAL: NAD, well-developed, well-groomed  EYES: PERRLA; conjunctiva and sclera clear  ENMT: Moist oral mucosa, no pharyngeal injection or exudates; normal dentition  NECK: Supple, no palpable masses; no thyromegaly  RESPIRATORY: Normal respiratory effort; lungs are clear to auscultation bilaterally  CARDIOVASCULAR: Regular rate and rhythm, normal S1 and S2, no murmur/rub/gallop; No lower extremity edema; Peripheral pulses are 2+ bilaterally  ABDOMEN: Nontender to palpation, normoactive bowel sounds, no rebound/guarding; No hepatosplenomegaly  MUSCULOSKELETAL:  Normal gait; no clubbing or cyanosis of digits; no joint swelling or tenderness to palpation; right forearm SVT less erythematous, mildly tender  PSYCH: A+O to person, place, and time; affect appropriate  NEUROLOGY: CN 2-12 are intact and symmetric; no gross sensory deficits   SKIN: No rashes; no palpable lesions    LABS:                        8.8    9.77  )-----------( 272      ( 28 May 2023 05:44 )             29.0     05-28    136  |  100  |  16  ----------------------------<  184<H>  4.2   |  23  |  0.64    Ca    8.9      28 May 2023 05:44  Phos  3.5     05-28  Mg     1.90     05-28                  RADIOLOGY & ADDITIONAL TESTS:  Results Reviewed:   Imaging Personally Reviewed:  Electrocardiogram Personally Reviewed:    COORDINATION OF CARE:  Care Discussed with Consultants/Other Providers [Y/N]:  Prior or Outpatient Records Reviewed [Y/N]:  
    Patient is a 67y old  Female who presents with a chief complaint of Respiratory distress (26 May 2023 09:46)      SUBJECTIVE / OVERNIGHT EVENTS: Pt generally feeling improved. Overnight, did have occasional scant phlegm, which was clear-to-white. Felt she was able to clear her throat better after a nebulizer treatment. She has been using her incentive spirometer and has no sob sitting upright in chair or ambulating in room. She still reports some residual sternal pain, which she attributes to recent CPR. PPM site is also a little sore; this pain responds well to oxycodone 5mg. Otherwise, pt notes a painful, swollen spot on her right forearm where her PIV was.    MEDICATIONS  (STANDING):  allopurinol 100 milliGRAM(s) Oral daily  aspirin enteric coated 81 milliGRAM(s) Oral daily  benzonatate 100 milliGRAM(s) Oral every 8 hours  carvedilol 6.25 milliGRAM(s) Oral every 12 hours  dextrose 5%. 1000 milliLiter(s) (50 mL/Hr) IV Continuous <Continuous>  dextrose 5%. 1000 milliLiter(s) (100 mL/Hr) IV Continuous <Continuous>  dextrose 50% Injectable 25 Gram(s) IV Push once  dextrose 50% Injectable 25 Gram(s) IV Push once  dextrose 50% Injectable 12.5 Gram(s) IV Push once  enoxaparin Injectable 40 milliGRAM(s) SubCutaneous every 24 hours  furosemide    Tablet 40 milliGRAM(s) Oral daily  glucagon  Injectable 1 milliGRAM(s) IntraMuscular once  insulin lispro (ADMELOG) corrective regimen sliding scale   SubCutaneous at bedtime  insulin lispro (ADMELOG) corrective regimen sliding scale   SubCutaneous three times a day before meals  lisinopril 2.5 milliGRAM(s) Oral daily  sodium chloride 3%  Inhalation 4 milliLiter(s) Inhalation every 12 hours    MEDICATIONS  (PRN):  acetaminophen     Tablet .. 650 milliGRAM(s) Oral every 6 hours PRN Temp greater or equal to 38C (100.4F), Mild Pain (1 - 3), Moderate Pain (4 - 6)  dextrose Oral Gel 15 Gram(s) Oral once PRN Blood Glucose LESS THAN 70 milliGRAM(s)/deciliter  guaiFENesin Oral Liquid (Sugar-Free) 200 milliGRAM(s) Oral every 6 hours PRN Cough  oxyCODONE    IR 5 milliGRAM(s) Oral every 8 hours PRN Severe Pain (7 - 10)      CAPILLARY BLOOD GLUCOSE      POCT Blood Glucose.: 268 mg/dL (27 May 2023 12:37)  POCT Blood Glucose.: 215 mg/dL (27 May 2023 08:25)  POCT Blood Glucose.: 191 mg/dL (26 May 2023 21:31)  POCT Blood Glucose.: 202 mg/dL (26 May 2023 17:52)    I&O's Summary    26 May 2023 07:01  -  27 May 2023 07:00  --------------------------------------------------------  IN: 1280 mL / OUT: 1350 mL / NET: -70 mL    27 May 2023 07:01  -  27 May 2023 12:54  --------------------------------------------------------  IN: 0 mL / OUT: 250 mL / NET: -250 mL        PHYSICAL EXAM:  Vital Signs Last 24 Hrs  T(C): 37 (27 May 2023 08:00), Max: 37.1 (26 May 2023 20:06)  T(F): 98.6 (27 May 2023 08:00), Max: 98.7 (26 May 2023 20:06)  HR: 64 (27 May 2023 08:00) (64 - 79)  BP: 109/66 (27 May 2023 08:00) (107/50 - 131/54)  BP(mean): --  RR: 20 (27 May 2023 08:20) (18 - 20)  SpO2: 92% (27 May 2023 08:20) (89% - 100%)    Parameters below as of 27 May 2023 08:20  Patient On (Oxygen Delivery Method): room air      CONSTITUTIONAL: obese, NAD, well-developed, well-groomed  EYES: PERRLA; conjunctiva and sclera clear  ENMT: Moist oral mucosa, no pharyngeal injection or exudates; normal dentition  NECK: Supple, no palpable masses; no thyromegaly  RESPIRATORY: Normal respiratory effort; lungs are clear to auscultation bilaterally  CARDIOVASCULAR: Regular rate and rhythm, normal S1 and S2, no murmur/rub/gallop; No lower extremity edema; Peripheral pulses are 2+ bilaterally; chest PPM site c/d/i  ABDOMEN: Nontender to palpation, normoactive bowel sounds, no rebound/guarding; No hepatosplenomegaly  MUSCULOSKELETAL:  Normal gait; no clubbing or cyanosis of digits; no joint swelling or tenderness to palpation; right forearm with swelling and induration, and ?palpable cord, no erythema, skin breakdown or exudate  PSYCH: A+O to person, place, and time; affect appropriate  NEUROLOGY: CN 2-12 are intact and symmetric; no gross sensory deficits   SKIN: No rashes; no palpable lesions    LABS:                        9.4    10.54 )-----------( 288      ( 27 May 2023 06:04 )             31.1     05-27    134<L>  |  97<L>  |  20  ----------------------------<  241<H>  3.9   |  25  |  0.70    Ca    9.1      27 May 2023 06:04  Phos  3.1     05-27  Mg     1.70     05-27    TPro  7.4  /  Alb  3.5  /  TBili  0.4  /  DBili  x   /  AST  20  /  ALT  14  /  AlkPhos  77  05-26                RADIOLOGY & ADDITIONAL TESTS:  Results Reviewed:   Imaging Personally Reviewed:  Electrocardiogram Personally Reviewed:    COORDINATION OF CARE:  Care Discussed with Consultants/Other Providers [Y/N]:  Prior or Outpatient Records Reviewed [Y/N]:  
    Patient is a 67y old  Female who presents with a chief complaint of PPM insertion [place] [facility] (28 May 2023 17:40)      SUBJECTIVE / OVERNIGHT EVENTS: Yesterday EP interrogated pacemaker and changed sensing to unipolar. Pt feeling well today. Denies SOB, chest pain, palpitations, lightheadedness. Ambulating independently without difficulty.    MEDICATIONS  (STANDING):  allopurinol 100 milliGRAM(s) Oral daily  aspirin enteric coated 81 milliGRAM(s) Oral daily  benzonatate 100 milliGRAM(s) Oral every 8 hours  carvedilol 6.25 milliGRAM(s) Oral every 12 hours  dextrose 5%. 1000 milliLiter(s) (50 mL/Hr) IV Continuous <Continuous>  dextrose 5%. 1000 milliLiter(s) (100 mL/Hr) IV Continuous <Continuous>  dextrose 50% Injectable 25 Gram(s) IV Push once  dextrose 50% Injectable 25 Gram(s) IV Push once  dextrose 50% Injectable 12.5 Gram(s) IV Push once  enoxaparin Injectable 40 milliGRAM(s) SubCutaneous every 24 hours  furosemide    Tablet 40 milliGRAM(s) Oral daily  glucagon  Injectable 1 milliGRAM(s) IntraMuscular once  insulin lispro (ADMELOG) corrective regimen sliding scale   SubCutaneous three times a day before meals  insulin lispro (ADMELOG) corrective regimen sliding scale   SubCutaneous at bedtime  lisinopril 2.5 milliGRAM(s) Oral daily    MEDICATIONS  (PRN):  acetaminophen     Tablet .. 650 milliGRAM(s) Oral every 6 hours PRN Temp greater or equal to 38C (100.4F), Mild Pain (1 - 3), Moderate Pain (4 - 6)  dextrose Oral Gel 15 Gram(s) Oral once PRN Blood Glucose LESS THAN 70 milliGRAM(s)/deciliter  guaiFENesin  milliGRAM(s) Oral every 12 hours PRN Cough  guaiFENesin Oral Liquid (Sugar-Free) 200 milliGRAM(s) Oral every 6 hours PRN Cough  oxyCODONE    IR 5 milliGRAM(s) Oral every 8 hours PRN Severe Pain (7 - 10)      CAPILLARY BLOOD GLUCOSE      POCT Blood Glucose.: 233 mg/dL (29 May 2023 08:25)  POCT Blood Glucose.: 239 mg/dL (28 May 2023 21:30)  POCT Blood Glucose.: 197 mg/dL (28 May 2023 17:53)  POCT Blood Glucose.: 273 mg/dL (28 May 2023 12:45)    I&O's Summary    28 May 2023 07:01  -  29 May 2023 07:00  --------------------------------------------------------  IN: 2300 mL / OUT: 0 mL / NET: 2300 mL        PHYSICAL EXAM:  Vital Signs Last 24 Hrs  T(C): 36.8 (29 May 2023 07:54), Max: 37.1 (28 May 2023 12:16)  T(F): 98.2 (29 May 2023 07:54), Max: 98.7 (28 May 2023 12:16)  HR: 67 (29 May 2023 07:54) (64 - 68)  BP: 101/51 (29 May 2023 07:54) (101/51 - 135/51)  BP(mean): --  RR: 18 (29 May 2023 07:54) (17 - 19)  SpO2: 98% (29 May 2023 07:54) (98% - 100%)    Parameters below as of 29 May 2023 07:54  Patient On (Oxygen Delivery Method): room air      CONSTITUTIONAL: NAD, well-developed, well-groomed  EYES: PERRLA; conjunctiva and sclera clear  ENMT: Moist oral mucosa, no pharyngeal injection or exudates; normal dentition  NECK: Supple, no palpable masses; no thyromegaly  RESPIRATORY: Normal respiratory effort; lungs are clear to auscultation bilaterally  CARDIOVASCULAR: Regular rate and rhythm, normal S1 and S2, no murmur/rub/gallop; No lower extremity edema; Peripheral pulses are 2+ bilaterally; PPM site c/d/i  ABDOMEN: Nontender to palpation, normoactive bowel sounds, no rebound/guarding; No hepatosplenomegaly  MUSCULOSKELETAL:  No clubbing or cyanosis of digits; no joint swelling or tenderness to palpation; RUE forearm SVT site nontender  PSYCH: A+O to person, place, and time; affect appropriate  NEUROLOGY: CN 2-12 are intact and symmetric; no gross sensory deficits   SKIN: No rashes; no palpable lesions    LABS:                        8.8    9.58  )-----------( 271      ( 29 May 2023 06:57 )             28.9     05-29    135  |  100  |  17  ----------------------------<  221<H>  4.1   |  24  |  0.81    Ca    9.0      29 May 2023 06:57  Phos  3.5     05-29  Mg     1.80     05-29                  RADIOLOGY & ADDITIONAL TESTS:  Results Reviewed:   Imaging Personally Reviewed:  Electrocardiogram Personally Reviewed:    COORDINATION OF CARE:  Care Discussed with Consultants/Other Providers [Y/N]:  Prior or Outpatient Records Reviewed [Y/N]:

## 2023-05-29 NOTE — PROGRESS NOTE ADULT - PROBLEM SELECTOR PROBLEM 1
Pain and swelling of right forearm
Cardiac arrhythmia
Cardiac arrhythmia

## 2023-05-29 NOTE — PROGRESS NOTE ADULT - TIME BILLING
Time-based billing (NON-critical care).     35 minutes spent on total encounter; more than 50% of the visit was spent counseling and / or coordinating care by the attending physician.  The necessity of the time spent during the encounter on this date of service was due to:     documentation in [place], reviewing chart and coordinating care with patient/ACP and interdisciplinary staff (such as , social workers, etc) as well as reviewing vitals, laboratory data, radiology, medication list, consultants' recommendations and prior records. Interventions were performed as documented above.
Time-based billing (NON-critical care).     51 minutes spent on total encounter; more than 50% of the visit was spent counseling and / or coordinating care by the attending physician.  The necessity of the time spent during the encounter on this date of service was due to:     documentation in [place], reviewing chart and coordinating care with patient/ACP and interdisciplinary staff (such as , social workers, etc) as well as reviewing vitals, laboratory data, radiology, medication list, consultants' recommendations and prior records. Interventions were performed as documented above.
Time-based billing (NON-critical care).     52 minutes spent on total encounter; more than 50% of the visit was spent counseling and / or coordinating care by the attending physician.  The necessity of the time spent during the encounter on this date of service was due to:     documentation in [place], reviewing chart and coordinating care with patient/ACP and interdisciplinary staff (such as , social workers, etc) as well as reviewing vitals, laboratory data, radiology, medication list, consultants' recommendations and prior records. Interventions were performed as documented above.
Time-based billing (NON-critical care).     35 minutes spent on total encounter; more than 50% of the visit was spent counseling and / or coordinating care by the attending physician.  The necessity of the time spent during the encounter on this date of service was due to:     documentation in [place], reviewing chart and coordinating care with patient/ACP and interdisciplinary staff (such as , social workers, etc) as well as reviewing vitals, laboratory data, radiology, medication list, consultants' recommendations and prior records. Interventions were performed as documented above.

## 2023-05-29 NOTE — PROGRESS NOTE ADULT - PROVIDER SPECIALTY LIST ADULT
Electrophysiology
Hospitalist

## 2023-05-29 NOTE — PROGRESS NOTE ADULT - PROBLEM SELECTOR PLAN 2
s/p CPR at [facility]  cont tele  s/p PPM for prolonged pauses and doing well  appreciate EP recs
s/p CPR at [facility]  cont tele  s/p PPM for prolonged pauses and doing well  appreciate EP recs
s/p intubation likely due to ADHF  now on 2L  wean as tolerated  incentive spirometry
s/p CPR at [facility]  cont tele  s/p PPM for prolonged pauses and doing well, now on unipolar sensing  appreciate EP recs
s/p intubation likely due to ADHF  now on 2L  wean as tolerated  incentive spirometry

## 2023-05-29 NOTE — PROGRESS NOTE ADULT - PROBLEM SELECTOR PLAN 4
cont allopurinol  no evidence of acute flare
cont coreg, ACE, lasix for now  seems euvolemic  lipid profile in AM
cont allopurinol  no evidence of acute flare
cont allopurinol  no evidence of acute flare
cont coreg, ACE, lasix for now  seems euvolemic

## 2023-05-29 NOTE — PROGRESS NOTE ADULT - PROBLEM SELECTOR PROBLEM 2
Acute hypoxemic respiratory failure
Cardiac arrhythmia
Acute hypoxemic respiratory failure

## 2023-05-29 NOTE — PROGRESS NOTE ADULT - PROBLEM SELECTOR PLAN 5
cont coreg, ACE, lasix for now  currently euvolemic
takes metformin and ozempic at home  FS QID  NSC/DASH diet  check a1c  sliding scale for now to determine poss basal bolus dosing
cont coreg, ACE, lasix for now  currently euvolemic
cont coreg, ACE, lasix for now  currently euvolemic
takes metformin and ozempic at home  FS QID  NSC/DASH diet  check a1c  sliding scale for now to determine poss basal bolus dosing

## 2023-05-29 NOTE — PROGRESS NOTE ADULT - PROBLEM SELECTOR PLAN 6
takes metformin and ozempic at home  FS QID  NSC/DASH diet  a1c = 7.6%  sliding scale in-house, with glucose in 100s-200s    Time planning discharge 35 minutes.
takes metformin and ozempic at home  FS QID  NSC/DASH diet  a1c = 7.6%  sliding scale in-house, with glucose in 100s-200s    Time planning discharge 35 minutes.
takes metformin and ozempic at home  FS QID  NSC/DASH diet  a1c = 7.6%  sliding scale in-house, with glucose in 100s-200s

## 2023-05-29 NOTE — PROGRESS NOTE ADULT - PROBLEM SELECTOR PLAN 3
cont allopurinol  no evidence of acute flare
s/p intubation at [place] likely due to ADHF  now comfortable on room air  can offer nebulizer prn; pt has scant phlegm and this seems to help her expectorate; suspect this will also continue to improve with incentive spirometry; observed and instructed pt technique at bedside
Detail Level: Detailed
s/p intubation at [place] likely due to ADHF  now comfortable on room air  can offer nebulizer prn; pt has scant phlegm and this seems to help her expectorate; suspect this will also continue to improve with incentive spirometry; observed and instructed pt technique at bedside
Introduction Text (Please End With A Colon): The following procedure was deferred:
s/p intubation at [place] likely due to ADHF  now comfortable on room air  can offer nebulizer prn; pt has scant phlegm and this seems to help her expectorate; suspect this will also continue to improve with incentive spirometry; observed and instructed pt technique at bedside
Procedure To Be Performed At Next Visit: Biopsy by punch method
cont allopurinol  no evidence of acute flare

## 2023-05-29 NOTE — PROGRESS NOTE ADULT - ASSESSMENT
Patient is a 67y old  Female with a PMH of DM, HTN, heart murmur, Gout, former smoker initially BIBEMS to  [facility] for respiratory distress. Subseq became unresponsive and lost her pulse; CPR initiated, ROSC less than 2 min per chart; pt Intubated and extubated shortly thereafter.  Pt noted to have prolonged pauses on tele and was tx to [facility] for PPM placement

## 2023-05-29 NOTE — PROGRESS NOTE ADULT - PROBLEM SELECTOR PROBLEM 3
Acute hypoxemic respiratory failure
Acute hypoxemic respiratory failure
Gout
Gout
Acute hypoxemic respiratory failure

## 2023-05-29 NOTE — PROGRESS NOTE ADULT - PROBLEM SELECTOR PLAN 1
At site of peripheral IV which per chart infiltrated overnight.  -Given induration and ?palpable cord, will order urgent duplex to rule out thrombus/thrombophlebitis  -Spoke personally to vascular ultrasound technician who will prioritize study   -Lovenox for VTE ppx; pt has been ambulatory during admission and now s/p invasive procedure with stable hgb and plt
At site of infiltrated peripheral IV. US showing SVT in proximal basilic vein  - There is no consensus among professional societies about the treatment of upper extremity SVT. Data are limited and in many cases relate to populations with lower extremity SVT.  - Nevertheless, expert opinion suggests treatment with full anticoagulation typically in cases where the SVT is >5cm or encroaching upon a deep vein  - This patient's SVT does not meet the above criteria, though she does have additional DVT risk factors so it is reasonable to provide prophylactic dose anticoagulation in the immediate post-discharge period.  - Discussed at length with patient who agrees with rivaroxaban 10mg daily x 30 days  - Pt will need to get repeat RUE duplex in 4 weeks to confirm resolution of SVT
At site of infiltrated peripheral IV. US showing SVT in proximal basilic vein  - There is no consensus among professional societies about the treatment of upper extremity SVT. Data are limited and in many cases relate to populations with lower extremity SVT.  - Nevertheless, expert opinion suggests treatment with full anticoagulation typically in cases where the SVT is >5cm or encroaching upon a deep vein  - This patient's SVT does not meet the above criteria, though she does have additional DVT risk factors so it is reasonable to provide prophylactic dose anticoagulation in the immediate post-discharge period.  - Discussed at length with patient who agrees with rivaroxaban 10mg daily x 30 days  - Pt will need to get repeat RUE duplex in 4 weeks to confirm resolution of SVT
s/p CPR  cont tele  plan for PPM for prolonged pauses  apprec EP eval
s/p CPR  cont tele  s/p PPM for prolonged pauses  f/u EP recs

## 2023-05-29 NOTE — PROGRESS NOTE ADULT - PROBLEM SELECTOR PROBLEM 4
Acute on chronic heart failure with preserved ejection fraction (HFpEF)
Gout
Acute on chronic heart failure with preserved ejection fraction (HFpEF)

## 2023-05-30 PROBLEM — Z00.00 ENCOUNTER FOR PREVENTIVE HEALTH EXAMINATION: Status: ACTIVE | Noted: 2023-05-30

## 2023-06-07 ENCOUNTER — APPOINTMENT (OUTPATIENT)
Dept: ELECTROPHYSIOLOGY | Facility: CLINIC | Age: 68
End: 2023-06-07
Payer: COMMERCIAL

## 2023-06-07 DIAGNOSIS — I10 ESSENTIAL (PRIMARY) HYPERTENSION: ICD-10-CM

## 2023-06-07 DIAGNOSIS — M10.9 GOUT, UNSPECIFIED: ICD-10-CM

## 2023-06-07 DIAGNOSIS — I82.890 ACUTE EMBOLISM AND THROMBOSIS OF OTHER SPECIFIED VEINS: ICD-10-CM

## 2023-06-07 DIAGNOSIS — E11.9 TYPE 2 DIABETES MELLITUS W/OUT COMPLICATIONS: ICD-10-CM

## 2023-06-07 DIAGNOSIS — Z86.79 PERSONAL HISTORY OF OTHER DISEASES OF THE CIRCULATORY SYSTEM: ICD-10-CM

## 2023-06-07 PROCEDURE — 99024 POSTOP FOLLOW-UP VISIT: CPT

## 2023-06-07 RX ORDER — ALLOPURINOL 100 MG/1
100 TABLET ORAL DAILY
Refills: 0 | Status: ACTIVE | COMMUNITY

## 2023-06-07 RX ORDER — FUROSEMIDE 40 MG/1
40 TABLET ORAL DAILY
Refills: 0 | Status: ACTIVE | COMMUNITY

## 2023-06-07 RX ORDER — RIVAROXABAN 20 MG/1
20 TABLET, FILM COATED ORAL
Refills: 0 | Status: ACTIVE | COMMUNITY

## 2023-06-07 RX ORDER — SEMAGLUTIDE 1.34 MG/ML
2 INJECTION, SOLUTION SUBCUTANEOUS
Refills: 0 | Status: ACTIVE | COMMUNITY

## 2023-06-07 RX ORDER — INSULIN LISPRO 100 [IU]/ML
100 INJECTION, SOLUTION INTRAVENOUS; SUBCUTANEOUS
Refills: 0 | Status: ACTIVE | COMMUNITY

## 2023-06-07 RX ORDER — LISINOPRIL 2.5 MG/1
2.5 TABLET ORAL DAILY
Refills: 0 | Status: ACTIVE | COMMUNITY

## 2023-07-19 ENCOUNTER — NON-APPOINTMENT (OUTPATIENT)
Age: 68
End: 2023-07-19

## 2023-07-19 ENCOUNTER — APPOINTMENT (OUTPATIENT)
Dept: ELECTROPHYSIOLOGY | Facility: CLINIC | Age: 68
End: 2023-07-19
Payer: COMMERCIAL

## 2023-07-19 PROCEDURE — 93280 PM DEVICE PROGR EVAL DUAL: CPT

## 2023-07-19 RX ORDER — METFORMIN HYDROCHLORIDE 1000 MG/1
1000 TABLET, COATED ORAL TWICE DAILY
Refills: 0 | Status: ACTIVE | COMMUNITY

## 2023-08-03 ENCOUNTER — APPOINTMENT (OUTPATIENT)
Dept: PULMONOLOGY | Facility: CLINIC | Age: 68
End: 2023-08-03
Payer: COMMERCIAL

## 2023-08-03 VITALS
DIASTOLIC BLOOD PRESSURE: 70 MMHG | HEIGHT: 63 IN | HEART RATE: 67 BPM | BODY MASS INDEX: 35.97 KG/M2 | OXYGEN SATURATION: 98 % | RESPIRATION RATE: 16 BRPM | SYSTOLIC BLOOD PRESSURE: 130 MMHG | WEIGHT: 203 LBS

## 2023-08-03 DIAGNOSIS — I35.0 NONRHEUMATIC AORTIC (VALVE) STENOSIS: ICD-10-CM

## 2023-08-03 DIAGNOSIS — Z87.891 PERSONAL HISTORY OF NICOTINE DEPENDENCE: ICD-10-CM

## 2023-08-03 DIAGNOSIS — Z82.49 FAMILY HISTORY OF ISCHEMIC HEART DISEASE AND OTHER DISEASES OF THE CIRCULATORY SYSTEM: ICD-10-CM

## 2023-08-03 PROCEDURE — 99205 OFFICE O/P NEW HI 60 MIN: CPT | Mod: 25

## 2023-08-03 PROCEDURE — G0296 VISIT TO DETERM LDCT ELIG: CPT

## 2023-08-03 NOTE — DISCUSSION/SUMMARY
[FreeTextEntry1] : 67-year-old female seen today for evaluation for sleep apnea.  Course complicated by acute pulmonary edema most likely secondary to advanced aortic stenosis with complicating arrhythmia.  Findings on pulmonary function test consistent with patient's history of tobacco use.  No evidence of interstitial lung disease seen on repeat chest CT from [facility].

## 2023-08-03 NOTE — END OF VISIT
[FreeTextEntry3] : Review of previous hospitalization including PACS review and review of work-up at [facility] including PACS review [Time Spent: ___ minutes] : I have spent [unfilled] minutes of time on the encounter.

## 2023-08-03 NOTE — HISTORY OF PRESENT ILLNESS
[Former] : former [Snoring] : snoring [Awakes with Dry Mouth] : awakes with dry mouth [Awakes with Headache] : awakes with headache [Fatigue] : fatigue [TextBox_4] : 67-year-old female former smoker DC'd in 2016 with a 60-pack-year history of cigarette smoking seen today for sleep evaluation.  Patient has a history of diabetes, hypertension, heart murmur and suffered an episode of respiratory arrest secondary to complete heart block.  She was admitted to [facility] and then transferred to [facility] for permanent pacemaker following a brief course of respiratory failure and intubation.   Work-up was later pursued at [facility] for second opinion which is reviewed below. [TextBox_11] : 1.5 [TextBox_13] : 30 [YearQuit] : 2016 [Awakes Unrefreshed] : does not awaken unrefreshed [Unusual Sleep Behavior] : no unusual sleep behavior [Cataplexy] : no cataplexy [Sleep Paralysis] : no sleep paralysis [Hypnagogic Hallucinations] : no hypnagogic hallucinations [TextBox_77] : 4756 [TextBox_79] : 3215 [TextBox_89] : 2 [ESS] : 7

## 2023-08-03 NOTE — CONSULT LETTER
[Dear  ___] : Dear  [unfilled], [Consult Letter:] : I had the pleasure of evaluating your patient, [unfilled]. [Please see my note below.] : Please see my note below. [Consult Closing:] : Thank you very much for allowing me to participate in the care of this patient.  If you have any questions, please do not hesitate to contact me. [Sincerely,] : Sincerely, [FreeTextEntry3] : Luis Eduardo Rodriguez MD FCCP Pulmonary/Critical Care/Sleep Medicine Department of Internal Medicine  [facility]

## 2023-08-03 NOTE — RESULTS/DATA
[TextEntry] : ACC: [id number] EXAM: CT ANGIO CHEST PULM [facility] ORDERED BY: CLAUDIA CULP PROCEDURE DATE: 04/14/2023 INTERPRETATION: HISTORY: Shortness of breath. EXAMINATION: CTA CHEST was performed for evaluation of the pulmonary arteries. Multiplanar reformatted images and MIPS were acquired. CONTRAST/COMPLICATIONS: IV Contrast: Omnipaque 350 70 cc administered 30 cc discarded Oral Contrast: NONE Complications: None reported at time of study completion COMPARISON: 11/1/2016. FINDINGS: PULMONARY ARTERIES: No pulmonary embolism. MEDIASTINUM: Mitral annular calcifications. Dilated left atrium. Coronary atherosclerosis. No pericardial effusion. Ascending thoracic aorta measures 3.9 cm. Mediastinal bilateral hilar lymphadenopathy appears similar compared to 1116; reference subcarinal 2.6 x 1.7 cm node (image 227, series 4). AIRWAYS, LUNGS, PLEURA: Clear central airways. Emphysema. Hazy ground-glass opacities throughout the lungs bilaterally. Interlobular septal thickening. Small bilateral pleural effusions (right greater than left) with associated right basilar atelectasis. IMAGED ABDOMEN: Surface contour nodularity of the liver. Cholecystectomy. Left adrenal myelolipoma. Few prominent upper abdominal nodes as on 11/1/2016. SOFT TISSUES: Anterior chest wall collateral vessels. BONES: Unremarkable. IMPRESSION:. No pulmonary embolism. Likely pulmonary edema with small bilateral pleural effusions. Surface contour nodularity of the liver is suggested; possibility of chronic liver disease is raised and recommend correlation with LFTs.  MACHO PETER MD; Attending Radiologist This document has been electronically signed. Apr 14 2023 11:00AM  7/14/2023: [facility] TTE-left ventricular ejection fraction of 55 to 60%, moderately calcified and mildly stenotic mitral valve with 1+ mitral regurgitation, moderate to severe aortic stenosis with 1-2+ aortic regurgitation.

## 2023-09-21 NOTE — PROGRESS NOTE ADULT - NS ATTEND AMEND GEN_ALL_CORE FT
SUBJECTIVE  Chief Complaint   Patient presents with   • Office Visit   • Annual Exam     A 53-year-old female presents for an annual physical examination and as below    Patient has given consent to record this visit for documentation in their clinical record.    HPI:Claire Escobedo is a 53 year old female who presents today   Historian: Self.    IUD (intrauterine device) in place: Her IUD Mirena was replaced on 8/22. Mentions that she got another IUD reinserted to get her through menopause. She did not get her periods till June but now getting it again. She had periods with Mirena also.    Essential hypertension, benign: Is on Losartan-Hydrochlorothiazide (Hyzaar) 50-12.5 mg once daily. The blood pressure checked by the MA today in the office is 120/84 mmHg.    Additional comments:  Annual physical examination:  Breast cancer screening: Up-to-date. She follows Grace Renee CNP for her breast evaluation and she manages her mammogram.  Dermatological screening: She has seen dermatologist because of the history of her dysplastic moles but she has no cancer history.  Sleep health: Mentions that her sleep health has improved and she has just started taking Melatonin with benefits.  Diet and exercise: Denies exercising due to her busy schedule.  Family history: Her father is in hospice right now and is suffering from metastatic lung cancer since 2020.  As well lost her fiance in June  Screening labs: Previous lab report reveals that her LDL has improved and is normal. Inquires about her bilirubin levels.  Current medication: Denies taking Tylenol, vitamin or any other supplement.   Immunization: Declines to take any immunization.    Depression and anxiety: She is not feeling good and feels that she is alone which makes it really difficult to get through some day. Finds it difficult to now open up with her dad.  Would like assist with this.     Right shoulder issue: She got a cortisone shot for her right shoulder 
pain which helped her for a month and is again experiencing it which is not as bad as it was in the past. She tried physical therapy for it which did not helped her.    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 2021   • Stress fracture of metatarsal bone 2013    Log Date: 2012     Past Surgical History:   Procedure Laterality Date   • Biopsy of breast, incisional      needle bx, benign   • Breast biopsy  2015    Fibroadenoma, x2   • Bunionectomy Bilateral    • Hb hysteroscopy  2022    remove and replace iud retained- mirena   • Iud insertion  2016    Mirena   • [place] tooth extraction       Social History     Tobacco Use   • Smoking status: Never   • Smokeless tobacco: Never   Vaping Use   • Vaping Use: never used   Substance Use Topics   • Alcohol use: Yes     Comment: socially    • Drug use: No      Social History     Social History Narrative   • Not on file      Family History   Problem Relation Age of Onset   • Hypertension Mother    • Cancer, Breast Mother 57        DCIS, found on mammogram, no chemo   • Depression Father    • Cancer Father    • Cancer, Lung Father    • Hypertension Sister    • Patient is unaware of any medical problems Brother    • Diabetes Maternal Grandmother    • Myocardial Infarction Maternal Grandfather          in his 50s of MI   • Osteoarthritis Paternal Grandmother    • Diabetes Paternal Grandmother    • Patient is unaware of any medical problems Paternal Grandfather    • Patient is unaware of any medical problems Son    • Patient is 
unaware of any medical problems Son        ALLERGIES:   Allergen Reactions   • Lisinopril Cough   • Penicillins Nausea & Vomiting       Meds reviewed and updated    SUBJECTIVE  As per hpi, otherwise negative for acute changes  Review of Systems  Constitutional: As Per HPI.  Endocrine: As Per HPI.   Genitourinary: As Per HPI.   Musculoskeletal: As Per HPI.  Psychiatric/Behavioral: As Per HPI.    OBJECTIVE  Vitals:   Visit Vitals  /84   Pulse 94   Temp 99.2 °F (37.3 °C)   Resp 16   Ht 5' 5\" (1.651 m)   Wt 65.3 kg (144 lb)   LMP 09/16/2023 (Approximate) Comment: stephanie   BMI 23.96 kg/m²     Claire's BMI is Body mass index is 23.96 kg/m².   Wt Readings from Last 4 Encounters:   09/20/23 65.3 kg (144 lb)   07/17/23 67.7 kg (149 lb 2.3 oz)   06/14/23 70.3 kg (155 lb)   05/17/23 71.2 kg (157 lb)       Relevant labs:  Lab Services on 09/13/2023   Component Date Value Ref Range Status   • Sodium 09/13/2023 141  135 - 145 mmol/L Final   • Potassium 09/13/2023 4.6  3.4 - 5.1 mmol/L Final   • Chloride 09/13/2023 103  97 - 110 mmol/L Final   • Carbon Dioxide 09/13/2023 28  21 - 32 mmol/L Final   • Anion Gap 09/13/2023 15  7 - 19 mmol/L Final   • Glucose 09/13/2023 81  70 - 99 mg/dL Final   • BUN 09/13/2023 14  6 - 20 mg/dL Final   • Creatinine 09/13/2023 0.84  0.51 - 0.95 mg/dL Final   • Glomerular Filtration Rate 09/13/2023 83  >=60 Final   • BUN/Cr 09/13/2023 17  7 - 25 Final   • Calcium 09/13/2023 9.3  8.4 - 10.2 mg/dL Final   • Bilirubin, Total 09/13/2023 1.4 (H)  0.2 - 1.0 mg/dL Final   • GOT/AST 09/13/2023 10  <=37 Units/L Final   • GPT/ALT 09/13/2023 18  <64 Units/L Final   • Alkaline Phosphatase 09/13/2023 58  45 - 117 Units/L Final   • Albumin 09/13/2023 3.9  3.6 - 5.1 g/dL Final   • Protein, Total 09/13/2023 7.1  6.4 - 8.2 g/dL Final   • Globulin 09/13/2023 3.2  2.0 - 4.0 g/dL Final   • A/G Ratio 09/13/2023 1.2  1.0 - 2.4 Final   • TSH 09/13/2023 3.168  0.350 - 5.000 mcUnits/mL Final   • Cholesterol 09/13/2023 181 
 <=199 mg/dL Final   • Triglycerides 09/13/2023 181 (H)  <=149 mg/dL Final   • HDL 09/13/2023 36 (L)  >=50 mg/dL Final   • LDL 09/13/2023 109  <=129 mg/dL Final   • Non-HDL Cholesterol 09/13/2023 145  mg/dL Final   • Cholesterol/ HDL Ratio 09/13/2023 5.0 (H)  <=4.4 Final   • WBC 09/13/2023 7.9  4.2 - 11.0 K/mcL Final   • RBC 09/13/2023 4.61  4.00 - 5.20 mil/mcL Final   • HGB 09/13/2023 13.9  12.0 - 15.5 g/dL Final   • HCT 09/13/2023 41.3  36.0 - 46.5 % Final   • MCV 09/13/2023 89.6  78.0 - 100.0 fl Final   • MCH 09/13/2023 30.2  26.0 - 34.0 pg Final   • MCHC 09/13/2023 33.7  32.0 - 36.5 g/dL Final   • RDW-CV 09/13/2023 11.7  11.0 - 15.0 % Final   • RDW-SD 09/13/2023 39.2  39.0 - 50.0 fL Final   • PLT 09/13/2023 282  140 - 450 K/mcL Final   • Neutrophil, Percent 09/13/2023 67  % Final   • Lymphocytes, Percent 09/13/2023 23  % Final   • Mono, Percent 09/13/2023 9  % Final   • Eosinophils, Percent 09/13/2023 1  % Final   • Basophils, Percent 09/13/2023 0  % Final   • Immature Granulocytes 09/13/2023 0  % Final   • Absolute Neutrophils 09/13/2023 5.3  1.8 - 7.7 K/mcL Final   • Absolute Lymphocytes 09/13/2023 1.8  1.0 - 4.0 K/mcL Final   • Absolute Monocytes 09/13/2023 0.7  0.3 - 0.9 K/mcL Final   • Absolute Eosinophils  09/13/2023 0.1  0.0 - 0.5 K/mcL Final   • Absolute Basophils 09/13/2023 0.0  0.0 - 0.3 K/mcL Final   • Absolute Immature Granulocytes 09/13/2023 0.0  0.0 - 0.2 K/mcL Final   Lab Services on 03/08/2023   Component Date Value Ref Range Status   • COLOR, URINALYSIS 03/08/2023 Straw   Final   • APPEARANCE, URINALYSIS 03/08/2023 Hazy   Final   • GLUCOSE, URINALYSIS 03/08/2023 Negative  Negative mg/dL Final   • BILIRUBIN, URINALYSIS 03/08/2023 Negative  Negative Final   • KETONES, URINALYSIS 03/08/2023 Negative  Negative mg/dL Final   • SPECIFIC GRAVITY, URINALYSIS 03/08/2023 1.012  1.005 - 1.030 Final   • OCCULT BLOOD, URINALYSIS 03/08/2023 Small (A)  Negative Final   • PH, URINALYSIS 03/08/2023 6.0  5.0 - 
68 y/o Female with a PMHx of DM, HTN, heart murmur, Gout, former smoker who present to [facility] in asystole. She is transferred to [facility] for PPM placement. Plan for device placement today.
7.0 Final   • PROTEIN, URINALYSIS 03/08/2023 Negative  Negative mg/dL Final   • UROBILINOGEN, URINALYSIS 03/08/2023 0.2  0.2, 1.0 mg/dL Final   • NITRITE, URINALYSIS 03/08/2023 Negative  Negative Final   • LEUKOCYTE ESTERASE, URINALYSIS 03/08/2023 Moderate (A)  Negative Final   • SQUAMOUS EPITHELIAL, URINALYSIS 03/08/2023 1 to 5  None Seen, 1 to 5 /hpf Final   • ERYTHROCYTES, URINALYSIS 03/08/2023 11 to 25 (A)  None Seen, 1 to 2 /hpf Final   • LEUKOCYTES, URINALYSIS 03/08/2023 >100 (A)  None Seen, 1 to 5 /hpf Final   • BACTERIA, URINALYSIS 03/08/2023 None Seen  None Seen /hpf Final   • HYALINE CASTS, URINALYSIS 03/08/2023 None Seen  None Seen, 1 to 5 /lpf Final   • MUCUS 03/08/2023 Present   Final   • Urine, Bacterial Culture 03/08/2023 60,000 ,000 CFU/mL Escherichia coli (A)   Final   Clinical Documentation on 03/07/2023   Component Date Value Ref Range Status   • HM COLONOSCOPY 03/07/2023 Abnormal   Final   Hospital Outpatient Visit on 03/02/2023   Component Date Value Ref Range Status   • URINE PREGNANCY,QUAL 03/02/2023 Negative  Negative Final   • Internal Procedural Controls Accep* 03/02/2023 Yes  Yes Final   • TEST LOT NUMBER 03/02/2023 [id number]   Final   • TEST LOT EXPIRATION DATE 03/02/2023 2024-04-30   Final   • Case Report 03/02/2023    Final                    Value:Surgical Pathology                                Case: [id number]                                 Authorizing Provider:  Bhavana Rooney MD           Collected:           03/02/2023 1405              Ordering Location:     [facility]  Received:            03/03/2023 1014                                     Center                                                                       Pathologist:           Vero Shaw MD                                                              Specimen:    Colon, ascending polyp                                                                    • Pathologic Diagnosis 03/02/2023    
Final                    Value:This result contains rich text formatting which cannot be displayed here.   • Clinical Information 03/02/2023    Final                    Value:This result contains rich text formatting which cannot be displayed here.   • Gross Description 03/02/2023    Final                    Value:This result contains rich text formatting which cannot be displayed here.   • Disclaimer 03/02/2023    Final                    Value:This result contains rich text formatting which cannot be displayed here.   Lab Services on 12/12/2022   Component Date Value Ref Range Status   • FSH 12/12/2022 3.8  mUnits/mL Final   Hospital Outpatient Visit on 08/25/2022   Component Date Value Ref Range Status   • URINE PREGNANCY,QUAL 08/25/2022 Negative  Negative Final   • Internal Procedural Controls Accep* 08/25/2022 Yes   Final   • TEST LOT NUMBER 08/25/2022 [id number]   Final   • TEST LOT EXPIRATION DATE 08/25/2022 10/31/2023   Final   Lab Services on 08/23/2022   Component Date Value Ref Range Status   • SARS-CoV-2 by PCR 08/23/2022 Not Detected  Not Detected / Detected / Inhibitor Present Final   • Isolation Guidelines 08/23/2022    Final                    Value:This result contains rich text formatting which cannot be displayed here.   • Procedural Notes 08/23/2022    Final                    Value:This result contains rich text formatting which cannot be displayed here.   Lab Services on 08/10/2022   Component Date Value Ref Range Status   • TSH (WITH REFLEX TO FREE T4) 08/10/2022 3.31  0.30 - 4.82 m[iU]/L Final   • CHOLESTEROL, TOTAL 08/10/2022 218 (H)  140 - 200 mg/dL Final   • HDL CHOLESTEROL 08/10/2022 35 (L)  >40 mg/dL Final   • LDL CHOL, CALCULATED 08/10/2022 147 (H)  30 - 100 mg/dL Final   • TRIGLYCERIDES 08/10/2022 180  0 - 200 mg/dL Final   Lab Services on 08/10/2022   Component Date Value Ref Range Status   • PROTEIN, TOTAL SERUM 08/10/2022 7.7  6.4 - 8.5 g/dL Final   • NA (SODIUM, SERUM) 08/10/2022 
140  136 - 146 mmol/L Final   • K (POTASSIUM, SERUM) 08/10/2022 4.8  3.5 - 5.3 mmol/L Final   • GLUCOSE, RANDOM 08/10/2022 84  70 - 200 mg/dL Final   • CREATININE, SERUM 08/10/2022 0.9  0.5 - 1.4 mg/dL Final   • CO2 VENOUS 08/10/2022 26  22 - 32 mmol/L Final   • CHLORIDE, SERUM 08/10/2022 105  96 - 107 mmol/L Final   • CALCIUM, SERUM 08/10/2022 10.2  8.6 - 10.6 mg/dL Final   • BLOOD UREA NITROGEN 08/10/2022 18  7 - 20 mg/dL Final   • ASPARTATE AMINOTRNSFRASE(SGOT) 08/10/2022 22  14 - 43 U/L Final   • BILIRUBIN, TOTAL 08/10/2022 1.7 (H)  0.0 - 1.3 mg/dL Final   • ALANINE AMINOTRANSFERASE(SGPT) 08/10/2022 19  4 - 38 U/L Final   • ALKALINE PHOSPHATASE([id number]) 08/10/2022 66  45 - 130 U/L Final   • ALBUMIN, SERUM 08/10/2022 4.8  3.6 - 5.1 g/dL Final   • EGFR*  08/10/2022 >60  >60 mL/min/[1.73m2] Final   • EGFR* NON- 08/10/2022 >60  >60 mL/min/[1.73m2] Final   • WHITE BLOOD CELL COUNT 08/10/2022 6.9  4.0 - 10.0 10*3/uL Final   • RED BLOOD CELL COUNT 08/10/2022 4.60  3.70 - 5.20 10*6/uL Final   • HEMOGLOBIN 08/10/2022 13.9  11.2 - 15.7 g/dL Final   • HEMATOCRIT 08/10/2022 40.2  34.0 - 45.0 % Final   • MEAN CORPUSCULAR VOLUME 08/10/2022 87.4  79.0 - 95.0 fL Final   • MEAN CORPUSCULAR HGB 08/10/2022 30.2  27.0 - 34.0 pg Final   • MEAN CORPUSCULAR HGB CONCENTRATION 08/10/2022 34.6  32.0 - 36.0 % Final   • PLATELET COUNT 08/10/2022 268  150 - 400 10*3/uL Final   • MEAN PLATELET VOLUME 08/10/2022 10.7  8.6 - 12.4 fL Final   • RED CELL DISTRIBUTION WIDTH 08/10/2022 12.4  11.3 - 14.8 % Final   • NEUTROPHIL PERCENT 08/10/2022 63.1  34.0 - 73.5 % Final   • NEUTROPHIL ABSOLUTE # 08/10/2022 4.4  1.4 - 6.5 10*3/uL Final   • IMMATURE GRANULOCYTE PERCENT 08/10/2022 0.3  0.0 - 0.5 % Final   • IMMATURE GRANULOCYTE ABSOLUTE 08/10/2022 0.02  0.00 - 0.05 10*3/uL Final   • LYMPH PERCENT 08/10/2022 27.1  20.5 - 51.1 % Final   • LYMPHOCYTE ABSOLUTE # 08/10/2022 1.9  1.2 - 3.4 10*3/uL Final   • MONOCYTE PERCENT 
08/10/2022 8.0  4.3 - 12.9 % Final   • MONOCYTE ABSOLUTE # 08/10/2022 0.6  0.2 - 0.9 10*3/uL Final   • EOSINOPHIL % 08/10/2022 1.2  0.0 - 10.0 % Final   • EOSINOPHIL ABSOLUTE # 08/10/2022 0.1  0.0 - 0.5 10*3/uL Final   • BASOPHIL % 08/10/2022 0.3  0.0 - 1.2 % Final   • BASOPHIL ABSOLUTE # 08/10/2022 0.0  0.0 - 0.1 10*3/uL Final   • DIFFERENTIAL TYPE 08/10/2022 AUTO DIFF   Final   Lab Requisition on 03/22/2022   Component Date Value Ref Range Status   • High Risk HPV 03/22/2022 Negative  Negative Final   • Disclaimer 03/22/2022    Final                    Value:This result contains rich text formatting which cannot be displayed here.   There may be more visits with results that are not included.       Physical Exam  Constitutional: Alert, in no acute distress and current vital signs reviewed.   Head and Face: Atraumatic, no deformities, normocephalic, normal facies.   Eyes: No discharge, normal conjunctiva, no eyelid swelling, no ptosis and the sclerae were normal. Pupils equal, round and reactive to light and accommodation, conjugate gaze and extraocular movements were intact.   ENT: Impacted ear cerumen in right ear. Normal appearing nose.   Neck: Normal appearing neck, supple neck and no mass was seen. Thyroid not enlarged and no thyroid nodules.  Lymphatic: No lymphadenopathy.   Pulmonary: No respiratory distress, normal respiratory rate and effort and no accessory muscle use. Breath sounds clear to auscultation bilaterally.   Cardiovascular: Normal rate, no murmurs were heard, regular rhythm, normal S1 and normal S2. Edema was not present in the lower extremities.   Abdomen: Soft, nontender, nondistended, normal bowel sounds and no abdominal mass. No hepatomegaly and no splenomegaly. No umbilical hernia was discovered.   Musculoskeletal: No obvious joint effusion or pedal edema.   Neurologic: Grossly intact.   Psychiatric: She did become slightly tearful while talking about the fact that she apparently had a 
s/p PPM. CXR reviewed. Device interrogated. Plan for outpatient follow up.
talk with her fiancé in June which she had messaged me about and her father is in hospice care.  Skin, Hair, Nails: Normal skin color and pigmentation and no rash.  Breasts: Negative findings: normal in size and symmetry, normal contour with no evidence of flattening or dimpling, nipples everted without rashes or discharge, palpation negative for masses or nodules or adenopathy.     ASSESSMENT/PLAN  Claire was seen today for office visit and annual exam.    Diagnoses and all orders for this visit:    Essential hypertension, benign  -     losartan-hydrochlorothiazide (HYZAAR) 50-12.5 MG per tablet; Take 1 tablet by mouth daily.  -     CBC with Automated Differential; Future  -     Thyroid Stimulating Hormone Reflex; Future  -     Comprehensive Metabolic Panel; Future  -     Lipid Panel With Reflex; Future    Situational depression  Comments:  uncontrolled  Orders:  -     escitalopram (LEXAPRO) 10 MG tablet; Take 1 tablet by mouth daily.    IUD (intrauterine device) in place  Comments:  mirena replaced 8/22    Routine general medical examination at a health care facility    Impacted cerumen of right ear    IUD (intrauterine device) in place:  Comments:  Mirena replaced 8/22  Mirena is in place.    Essential hypertension, benign:  Wellcontrolled.  Ordered fasting labs to be done prior to her next visit.  Recommended continuing Losartan-Hydrochlorothiazide (Hyzaar) 50-12.5 mg once daily. Refills provided.  -     losartan-hydrochlorothiazide (HYZAAR) 50-12.5 MG per tablet; Take 1 tablet by mouth daily.  -     CBC with Automated Differential; Future  -     Thyroid Stimulating Hormone Reflex; Future  -     Comprehensive Metabolic Panel; Future  -     Lipid Panel With Reflex; Future    Additional plan:  Annual physical examination:  Reviewed and discussed previous lab reports.  Ordered fasting labs to be done prior to the next visit.  Recommended taking the Omega-3 fish oil.  Advised her to not use the Q-tip as she can jam 
the cerumen inside.  Discussed different ear cerumen removal options with her. Rationale explained.  Discussed briefly about gilbert syndrome.    Depression and anxiety:   Hussein's depression and anxiety inventory has been given to her in the office today- reviewed.  Several level 1, total 14 depression inventory.  No thoughts of hurting self  Several level 1, total 8 anxiety inventory.     Prescribed Escitalopram (Lexapro) 10 mg once daily. Can take half tablet for a few days and then switch to taking the whole tablet if side effect.  Discussed with patient the pros and cons of SSRI management as well as the studies that indicate that prolonged management for at least a good 6-12 months is appropriate once controlled.  Discussed potential side effects to include weight loss weight gain libido typically improves but may change otherwise and other sexual side effects.  As well discussed that she should decrease intake of alcohol to avoid potential high intensity hangover type side effect or headache.        Advised her to follow up through a video visit since she is starting Lexapro.  Explained about the side effects and benefits of Lexapro.     Right shoulder issue:  Advised her to follow the physical therapy.  Discussed briefly about frozen shoulder.  Return to ortho if needed     Other orders:  -     escitalopram (LEXAPRO) 10 MG tablet; Take 1 tablet by mouth daily.    See HPI for review of all /any testing.    - Advised for seasonal influenza vaccine if not done/when available.     Preventative Care Discussions:   Repeat physical exam in 1 year  Monthly self breast exams  Recommended weight bearing exercises to maintain bone density   Regular exercise - Recommend 30 minutes up to 5 to 7 days a week  Supplemental vitamin D  Breast health following otherwise    Follow up via video visit on 10/20/2023 and then for an annual physical examination in a year with fasting labs prior to her visit.    Refer to 
orders.  Medical compliance with plan discussed and risks of non-compliance reviewed.  Patient education completed on disease process, etiology & prognosis.  Proper usage and side effects of medications reviewed & discussed.  Return to clinic as clinically indicated as discussed with patient who verbalized understanding of the plan and is in agreement with the plan.    I, Alexys Lindsey, have created a visit summary document based on the audio recording between Dr. Riya Flower MD and this patient for the physician to review, edit as needed, and authenticate.  Creation Date: 9/27/2023    I have reviewed and edited the visit summary above and attest that it is accurate.    Electronically signed Riya Flower ([facility]), MD     The 21st Century Cures Act makes medical progress notes and procedural notes available to patients in the interest of transparency. Medical documents contain medically relevant information, including facts and medical opinions. The sole purpose of a medical note is to serve as communication among medical providers and to serve as a durable record of treatment. The language and terminology used in medical documentation is used in a manner to make documentation clear to physicians, not patients.

## 2023-10-12 ENCOUNTER — OUTPATIENT (OUTPATIENT)
Dept: OUTPATIENT SERVICES | Facility: HOSPITAL | Age: 68
LOS: 1 days | End: 2023-10-12
Payer: COMMERCIAL

## 2023-10-12 DIAGNOSIS — G47.33 OBSTRUCTIVE SLEEP APNEA (ADULT) (PEDIATRIC): ICD-10-CM

## 2023-10-12 DIAGNOSIS — Z90.49 ACQUIRED ABSENCE OF OTHER SPECIFIED PARTS OF DIGESTIVE TRACT: Chronic | ICD-10-CM

## 2023-10-12 PROCEDURE — 95810 POLYSOM 6/> YRS 4/> PARAM: CPT | Mod: 26

## 2023-10-12 PROCEDURE — 95810 POLYSOM 6/> YRS 4/> PARAM: CPT

## 2023-10-20 ENCOUNTER — NON-APPOINTMENT (OUTPATIENT)
Age: 68
End: 2023-10-20

## 2023-10-20 ENCOUNTER — APPOINTMENT (OUTPATIENT)
Dept: ELECTROPHYSIOLOGY | Facility: CLINIC | Age: 68
End: 2023-10-20
Payer: COMMERCIAL

## 2023-10-20 PROCEDURE — 93296 REM INTERROG EVL PM/IDS: CPT

## 2023-10-20 PROCEDURE — 93294 REM INTERROG EVL PM/LDLS PM: CPT

## 2023-11-15 ENCOUNTER — APPOINTMENT (OUTPATIENT)
Dept: PULMONOLOGY | Facility: CLINIC | Age: 68
End: 2023-11-15

## 2023-11-30 ENCOUNTER — APPOINTMENT (OUTPATIENT)
Dept: PULMONOLOGY | Facility: CLINIC | Age: 68
End: 2023-11-30
Payer: COMMERCIAL

## 2023-11-30 DIAGNOSIS — Z71.89 OTHER SPECIFIED COUNSELING: ICD-10-CM

## 2023-11-30 DIAGNOSIS — G47.33 OBSTRUCTIVE SLEEP APNEA (ADULT) (PEDIATRIC): ICD-10-CM

## 2023-11-30 PROCEDURE — 99443: CPT

## 2024-01-30 NOTE — PROCEDURE
Time: 6:30 PM EST  Date of encounter:  1/30/2024  Independent Historian/Clinical History and Information was obtained by:   Patient    History is limited by: N/A    Chief Complaint   Patient presents with    Back Pain     Patient presents today with lower back pain that radiates into her legs and feet.  Patient states she has been seen multiple times for same, and that she cannot stand the pain.  Patient also states he blood pressure was elevated at urgent care today.            History of Present Illness:  Patient is a 45 y.o. year old female who presents to the emergency department for evaluation of low back pain that has been worsening in the last 1 and half.  Patient states that the pain radiates to her bilateral lower extremities, right greater than left.  She has a history of back surgery.  Denies any recent falls or traumas.  Denies any loss of bowel or bladder functions.  Denies any saddle anesthesia.  She states that she has been taking Tylenol and Advil with no relief.  She is scheduled to see her primary care doctor in 8 days. (Triage Provider: Feng Leone PA-C).      Patient Care Team  Primary Care Provider: Rich Flores MD    Past Medical History:     Allergies   Allergen Reactions    Pyridium [Phenazopyridine Hcl] Other (See Comments)     Pt unsure of reaction     Bactrim [Sulfamethoxazole-Trimethoprim] Palpitations    Penicillins Palpitations     Past Medical History:   Diagnosis Date    Abdominal pain     LEFT SIDE    Anxiety     Asthma     COPD (chronic obstructive pulmonary disease)     COPD (chronic obstructive pulmonary disease)     early signs    GERD (gastroesophageal reflux disease)     History of high blood pressure     NO MEDS SINCE 7/2017    History of transfusion     POST TUBALIGATION    Lower back pain     Lumbar herniated disc     Numbness     LEFT LEG    Panic attack     Sleep apnea     NO C-PAP IN USE    Ulcer 2017    ENDOSCOPY     Past Surgical History:   Procedure  Laterality Date    COLONOSCOPY N/A 2017    - Internal hemorrhoids. Anal papilla(e) were hypertrophied One 4 mm polyp in the descending colon,One 4 mm polyp in the rectum, removed with a cold biopsy forceps. Resected and retrieved removed with a cold biopsy forceps.    D & C CERVICAL BIOPSY  2017    ENDOSCOPY N/A 2017    Procedure: ESOPHAGOGASTRODUODENOSCOPY with bx;  Surgeon: Cherrie Lazaro MD;  Location: [facility] ENDOSCOPY;  Service:     LUMBAR DISCECTOMY FUSION INSTRUMENTATION Left 2018    Procedure: L5-S1 discectomy and fusion with instrumentation;  Surgeon: Venkatesh Valero MD;  Location: [facility] MAIN OR;  Service: Neurosurgery    TOTAL LAPAROSCOPIC HYSTERECTOMY Bilateral 2017    Procedure: TOTAL LAPAROSCOPIC HYSTERECTOMY WITH DAVINCI ROBOT MATTI SALPINGECTOMY LEFT OOPHORECTOMY;  Surgeon: Julianna Saenz MD;  Location: [facility] MAIN OR;  Service:     TUBAL ABDOMINAL LIGATION       Family History   Problem Relation Age of Onset    Heart disease Mother     Hypertension Mother     Heart disease Sister     Malig Hyperthermia Neg Hx        Home Medications:  Prior to Admission medications    Medication Sig Start Date End Date Taking? Authorizing Provider   naproxen (NAPROSYN) 500 MG tablet Take 1 tablet by mouth 2 (Two) Times a Day As Needed for Mild Pain or Moderate Pain. 23   Cindy Blackburn PA-C   predniSONE (DELTASONE) 50 MG tablet Take 1 tablet by mouth Daily. 24   Provider, MD Timbo        Social History:   Social History     Tobacco Use    Smoking status: Every Day     Packs/day: 1.00     Years: 14.00     Additional pack years: 0.00     Total pack years: 14.00     Types: Cigarettes     Start date:      Last attempt to quit: 3/27/2018     Years since quittin.8    Smokeless tobacco: Never   Substance Use Topics    Alcohol use: No    Drug use: No         Review of Systems:  Review of Systems   Constitutional:  Negative for chills and fever.   HENT:  Negative for  "congestion, ear pain and sore throat.    Eyes:  Negative for pain.   Respiratory:  Negative for cough, chest tightness and shortness of breath.    Cardiovascular:  Negative for chest pain.   Gastrointestinal:  Negative for abdominal pain, diarrhea, nausea and vomiting.   Genitourinary:  Negative for flank pain and hematuria.   Musculoskeletal:  Positive for back pain. Negative for joint swelling.   Skin:  Negative for pallor.   Neurological:  Negative for seizures and headaches.   All other systems reviewed and are negative.       Physical Exam:  /92 (BP Location: Left arm, Patient Position: Sitting)   Pulse 86   Temp 98.3 °F (36.8 °C) (Oral)   Resp 18   Ht 160 cm (63\")   Wt 67.8 kg (149 lb 7.6 oz)   LMP 07/17/2017   SpO2 100%   BMI 26.48 kg/m²         Physical Exam  Vitals and nursing note reviewed.   Constitutional:       General: She is not in acute distress.     Appearance: Normal appearance. She is not toxic-appearing.   HENT:      Head: Normocephalic and atraumatic.      Mouth/Throat:      Mouth: Mucous membranes are moist.   Eyes:      General: No scleral icterus.     Pupils: Pupils are equal, round, and reactive to light.   Cardiovascular:      Rate and Rhythm: Normal rate and regular rhythm.      Pulses: Normal pulses.      Heart sounds: Normal heart sounds. No murmur heard.  Pulmonary:      Effort: Pulmonary effort is normal. No respiratory distress.      Breath sounds: Normal breath sounds.   Abdominal:      General: Abdomen is flat. There is no distension.      Palpations: Abdomen is soft.      Tenderness: There is no abdominal tenderness.   Musculoskeletal:         General: Normal range of motion.      Cervical back: Normal range of motion and neck supple.      Lumbar back: Spasms and tenderness present. Decreased range of motion. Negative right straight leg raise test and negative left straight leg raise test.   Skin:     General: Skin is warm and dry.      Capillary Refill: Capillary " refill takes less than 2 seconds.   Neurological:      General: No focal deficit present.      Mental Status: She is alert and oriented to person, place, and time. Mental status is at baseline.   Psychiatric:         Mood and Affect: Mood normal.         Behavior: Behavior normal.         Judgment: Judgment normal.                   Procedures:  Procedures      Medical Decision Making:      Comorbidities that affect care:    70, asthma, lumbar herniated disc, abdominal pain    External Notes reviewed:    Previous Clinic Note: 1/30/2024 urgent care visit chief complaint of right leg pain bowel bladder numbness noticed with chronic right-sided low back pain with right sided sciatica      The following orders were placed and all results were independently analyzed by me:  Orders Placed This Encounter   Procedures    XR Spine Lumbar 2 or 3 View       Medications Given in the Emergency Department:  Medications   methocarbamol (ROBAXIN) tablet 750 mg (750 mg Oral Given 1/30/24 2058)   ketorolac (TORADOL) injection 30 mg (30 mg Intramuscular Given 1/30/24 2058)   dexAMETHasone sodium phosphate injection 10 mg (10 mg Intramuscular Given 1/30/24 2058)        ED Course:    The patient was initially evaluated in the triage area where orders were placed. The patient was later dispositioned by CIARA Burkett.      The patient was advised to stay for completion of workup which includes but is not limited to communication of labs and radiological results, reassessment and plan. The patient was advised that leaving prior to disposition by a provider could result in critical findings that are not communicated to the patient.     ED Course as of 01/30/24 2111 Tue Jan 30, 2024   1831 PROVIDER IN TRIAGE  Patient was evaluated by Feng singh PA-C. Orders were placed and awaiting final results and disposition.   [MV]      ED Course User Index  [MV] Feng Leone PA       Labs:    Lab Results (last 24 hours)       ** No  results found for the last 24 hours. **             Imaging:    XR Spine Lumbar 2 or 3 View    Result Date: 1/30/2024  PROCEDURE: XR SPINE LUMBAR 2 OR 3 VW  COMPARISON: [facility], CR, XR SPINE LUMBAR 2 OR 3 VW, 12/31/2023, 10:41.  INDICATIONS: LOWER BACK PAIN NO INJURY  FINDINGS:  There are postsurgical changes of L5-S1 lumbar fusion with bilateral pedicle screws and vertical stabilization rods.  The hardware appears intact.  There is unchanged grade 1 anterolisthesis of L4 on L5 with degenerative facet arthropathy at L3-L4 and L4-5.  The vertebral body heights are maintained without evidence of acute fracture.  Spinous processes and transverse processes are intact.        1. Postsurgical changes of L5-S1 lumbar fusion without evidence of hardware complication. 2. Unchanged grade 1 anterolisthesis of L4 on L5 with degenerative facet arthropathy at L3-L4 and L4-5.      DARRON SAL MD       Electronically Signed and Approved By: DARRON SAL MD on 1/30/2024 at 20:51                Differential Diagnosis and Discussion:      Back Pain: The patient presents with back pain. My differential diagnosis includes but is not limited to acute spinal epidural abscess, acute spinal epidural bleed, cauda equina syndrome, abdominal aortic aneurysm, aortic dissection, kidney stone, pyelonephritis, musculoskeletal back pain, spinal fracture, and osteoarthritis.     All X-rays impressions were independently interpreted by me.    MDM     Amount and/or Complexity of Data Reviewed  Tests in the radiology section of CPT®: reviewed    Risk of Complications, Morbidity, and/or Mortality  Presenting problems: moderate  Diagnostic procedures: moderate  Management options: moderate         Patient Care Considerations:    NARCOTICS: I considered prescribing opiate pain medication as an outpatient, however pain is controlled.      Consultants/Shared Management Plan:    None    Social Determinants of Health:    Patient is independent,  reliable, and has access to care.       Disposition and Care Coordination:    Discharged: The patient is suitable and stable for discharge with no need for consideration of admission.    [unfilled]  I have explained discharge medications and the need for follow up with the patient/caretakers. This was also printed in the discharge instructions. Patient was discharged with the following medications and follow up:      Medication List        New Prescriptions      ketorolac 10 MG tablet  Commonly known as: TORADOL  Take 1 tablet by mouth Every 6 (Six) Hours As Needed for Moderate Pain. Patient tolerated Toradol in the ER     methocarbamol 500 MG tablet  Commonly known as: ROBAXIN  Take 1 tablet by mouth 4 (Four) Times a Day.            Stop      naproxen 500 MG tablet  Commonly known as: NAPROSYN               Where to Get Your Medications        These medications were sent to [facility]/pharmacy #[id number] - [place], [place] - [address] - [phone]  - [phone] FX  [address] [place] [address]      Hours: 24-hours Phone: [phone]   ketorolac 10 MG tablet  methocarbamol 500 MG tablet      Rich Flores MD  [address] [facility] [address]  [facility] [address] [phone]    Schedule an appointment as soon as possible for a visit   As needed       Final diagnoses:   Chronic low back pain, unspecified back pain laterality, unspecified whether sciatica present        ED Disposition       ED Disposition   Discharge    Condition   Stable    Comment   --               This medical record created using voice recognition software.             Jenifer Maurer, APRN  01/30/24 2026     [[email or web address]] : 7/14/2023: Pulmonary function test-lung volumes normal, spirometry normal, diffusion capacity when corrected for alveolar volume is mildly decreased. Impression: Diffusion deficit consistent with pulmonary hypertension, early interstitial lung disease, emphysema,  7/14/2023: [facility] CT angiography of the chest-linear atelectasis within the lingula.  Calcification of the coronary arteries.  Calcification mitral annulus.  Calcification of the aortic valve.  7/14/2023 [facility] abdominal CT and pelvis-status post cholecystectomy.  Fatty liver.  Hepatic cirrhosis.  Pancreatic atrophy.  Nonobstructing left renal calculus

## 2024-01-31 ENCOUNTER — APPOINTMENT (OUTPATIENT)
Dept: ELECTROPHYSIOLOGY | Facility: CLINIC | Age: 69
End: 2024-01-31
Payer: COMMERCIAL

## 2024-01-31 ENCOUNTER — NON-APPOINTMENT (OUTPATIENT)
Age: 69
End: 2024-01-31

## 2024-01-31 DIAGNOSIS — I45.5 OTHER SPECIFIED HEART BLOCK: ICD-10-CM

## 2024-01-31 PROCEDURE — 93280 PM DEVICE PROGR EVAL DUAL: CPT

## 2024-01-31 RX ORDER — ASPIRIN ENTERIC COATED TABLETS 81 MG 81 MG/1
81 TABLET, DELAYED RELEASE ORAL
Refills: 0 | Status: DISCONTINUED | COMMUNITY
End: 2024-01-31

## 2024-01-31 RX ORDER — CARVEDILOL 6.25 MG/1
6.25 TABLET, FILM COATED ORAL
Refills: 0 | Status: DISCONTINUED | COMMUNITY
End: 2024-01-31

## 2024-01-31 RX ORDER — METOPROLOL SUCCINATE 25 MG/1
25 TABLET, EXTENDED RELEASE ORAL
Refills: 0 | Status: ACTIVE | COMMUNITY

## 2024-05-01 ENCOUNTER — APPOINTMENT (OUTPATIENT)
Dept: ELECTROPHYSIOLOGY | Facility: CLINIC | Age: 69
End: 2024-05-01
Payer: COMMERCIAL

## 2024-05-01 ENCOUNTER — NON-APPOINTMENT (OUTPATIENT)
Age: 69
End: 2024-05-01

## 2024-05-01 PROCEDURE — 93294 REM INTERROG EVL PM/LDLS PM: CPT

## 2024-05-01 PROCEDURE — 93296 REM INTERROG EVL PM/IDS: CPT

## 2024-07-30 ENCOUNTER — NON-APPOINTMENT (OUTPATIENT)
Age: 69
End: 2024-07-30

## 2024-07-31 ENCOUNTER — APPOINTMENT (OUTPATIENT)
Dept: ELECTROPHYSIOLOGY | Facility: CLINIC | Age: 69
End: 2024-07-31
Payer: COMMERCIAL

## 2024-07-31 PROCEDURE — 93294 REM INTERROG EVL PM/LDLS PM: CPT

## 2024-07-31 PROCEDURE — 93296 REM INTERROG EVL PM/IDS: CPT

## 2024-10-30 ENCOUNTER — APPOINTMENT (OUTPATIENT)
Dept: ELECTROPHYSIOLOGY | Facility: CLINIC | Age: 69
End: 2024-10-30
Payer: COMMERCIAL

## 2024-10-30 ENCOUNTER — NON-APPOINTMENT (OUTPATIENT)
Age: 69
End: 2024-10-30

## 2024-10-30 PROCEDURE — 93294 REM INTERROG EVL PM/LDLS PM: CPT

## 2024-10-30 PROCEDURE — 93296 REM INTERROG EVL PM/IDS: CPT

## 2025-01-29 ENCOUNTER — NON-APPOINTMENT (OUTPATIENT)
Age: 70
End: 2025-01-29

## 2025-01-29 ENCOUNTER — APPOINTMENT (OUTPATIENT)
Dept: ELECTROPHYSIOLOGY | Facility: CLINIC | Age: 70
End: 2025-01-29
Payer: COMMERCIAL

## 2025-01-29 VITALS
OXYGEN SATURATION: 98 % | SYSTOLIC BLOOD PRESSURE: 113 MMHG | HEIGHT: 63 IN | WEIGHT: 203 LBS | HEART RATE: 73 BPM | BODY MASS INDEX: 35.97 KG/M2 | DIASTOLIC BLOOD PRESSURE: 69 MMHG

## 2025-01-29 DIAGNOSIS — I49.5 SICK SINUS SYNDROME: ICD-10-CM

## 2025-01-29 DIAGNOSIS — I45.5 OTHER SPECIFIED HEART BLOCK: ICD-10-CM

## 2025-01-29 PROCEDURE — 93280 PM DEVICE PROGR EVAL DUAL: CPT

## 2025-01-29 PROCEDURE — 99214 OFFICE O/P EST MOD 30 MIN: CPT | Mod: 25

## 2025-01-29 RX ORDER — ASPIRIN 81 MG/1
81 TABLET, CHEWABLE ORAL
Refills: 0 | Status: ACTIVE | COMMUNITY

## 2025-01-29 RX ORDER — EMPAGLIFLOZIN 10 MG/1
10 TABLET, FILM COATED ORAL
Refills: 0 | Status: ACTIVE | COMMUNITY

## 2025-01-30 PROBLEM — I49.5 SINUS NODE DYSFUNCTION: Status: ACTIVE | Noted: 2025-01-29

## 2025-04-16 ENCOUNTER — NON-APPOINTMENT (OUTPATIENT)
Age: 70
End: 2025-04-16

## 2025-04-16 ENCOUNTER — APPOINTMENT (OUTPATIENT)
Dept: ELECTROPHYSIOLOGY | Facility: CLINIC | Age: 70
End: 2025-04-16
Payer: COMMERCIAL

## 2025-04-16 VITALS
BODY MASS INDEX: 35.97 KG/M2 | OXYGEN SATURATION: 100 % | HEIGHT: 63 IN | DIASTOLIC BLOOD PRESSURE: 70 MMHG | HEART RATE: 79 BPM | WEIGHT: 203 LBS | SYSTOLIC BLOOD PRESSURE: 124 MMHG

## 2025-04-16 DIAGNOSIS — I48.0 PAROXYSMAL ATRIAL FIBRILLATION: ICD-10-CM

## 2025-04-16 DIAGNOSIS — I49.5 SICK SINUS SYNDROME: ICD-10-CM

## 2025-04-16 PROCEDURE — 99214 OFFICE O/P EST MOD 30 MIN: CPT | Mod: 25

## 2025-04-16 PROCEDURE — 93000 ELECTROCARDIOGRAM COMPLETE: CPT

## 2025-05-01 ENCOUNTER — APPOINTMENT (OUTPATIENT)
Dept: ELECTROPHYSIOLOGY | Facility: CLINIC | Age: 70
End: 2025-05-01
Payer: COMMERCIAL

## 2025-05-01 ENCOUNTER — NON-APPOINTMENT (OUTPATIENT)
Age: 70
End: 2025-05-01

## 2025-05-01 PROCEDURE — 93294 REM INTERROG EVL PM/LDLS PM: CPT

## 2025-05-01 PROCEDURE — 93296 REM INTERROG EVL PM/IDS: CPT
